# Patient Record
Sex: MALE | Race: WHITE | NOT HISPANIC OR LATINO | Employment: OTHER | URBAN - METROPOLITAN AREA
[De-identification: names, ages, dates, MRNs, and addresses within clinical notes are randomized per-mention and may not be internally consistent; named-entity substitution may affect disease eponyms.]

---

## 2023-03-17 LAB
ALBUMIN (G/DL) IN SER/PLAS: 3.8 G/DL (ref 3.4–5)
ALBUMIN (MG/L) IN URINE: 10.8 MG/L
ALBUMIN/CREATININE (UG/MG) IN URINE: 12.5 UG/MG CRT (ref 0–30)
ANION GAP IN SER/PLAS: 13 MMOL/L (ref 10–20)
CALCIDIOL (25 OH VITAMIN D3) (NG/ML) IN SER/PLAS: 50 NG/ML
CALCIUM (MG/DL) IN SER/PLAS: 9.1 MG/DL (ref 8.6–10.3)
CARBON DIOXIDE, TOTAL (MMOL/L) IN SER/PLAS: 28 MMOL/L (ref 21–32)
CHLORIDE (MMOL/L) IN SER/PLAS: 103 MMOL/L (ref 98–107)
CREATININE (MG/DL) IN SER/PLAS: 2.47 MG/DL (ref 0.5–1.3)
CREATININE (MG/DL) IN URINE: 86.3 MG/DL (ref 20–370)
ERYTHROCYTE DISTRIBUTION WIDTH (RATIO) BY AUTOMATED COUNT: 15 % (ref 11.5–14.5)
ERYTHROCYTE MEAN CORPUSCULAR HEMOGLOBIN CONCENTRATION (G/DL) BY AUTOMATED: 31.1 G/DL (ref 32–36)
ERYTHROCYTE MEAN CORPUSCULAR VOLUME (FL) BY AUTOMATED COUNT: 83 FL (ref 80–100)
ERYTHROCYTES (10*6/UL) IN BLOOD BY AUTOMATED COUNT: 4.46 X10E12/L (ref 4.5–5.9)
GFR MALE: 25 ML/MIN/1.73M2
GLUCOSE (MG/DL) IN SER/PLAS: 211 MG/DL (ref 74–99)
HEMATOCRIT (%) IN BLOOD BY AUTOMATED COUNT: 37 % (ref 41–52)
HEMOGLOBIN (G/DL) IN BLOOD: 11.5 G/DL (ref 13.5–17.5)
LEUKOCYTES (10*3/UL) IN BLOOD BY AUTOMATED COUNT: 7.7 X10E9/L (ref 4.4–11.3)
PHOSPHATE (MG/DL) IN SER/PLAS: 3.5 MG/DL (ref 2.5–4.9)
PLATELETS (10*3/UL) IN BLOOD AUTOMATED COUNT: 174 X10E9/L (ref 150–450)
POTASSIUM (MMOL/L) IN SER/PLAS: 3.5 MMOL/L (ref 3.5–5.3)
SODIUM (MMOL/L) IN SER/PLAS: 140 MMOL/L (ref 136–145)
THYROTROPIN (MIU/L) IN SER/PLAS BY DETECTION LIMIT <= 0.05 MIU/L: 2.42 MIU/L (ref 0.44–3.98)
UREA NITROGEN (MG/DL) IN SER/PLAS: 42 MG/DL (ref 6–23)

## 2023-03-18 LAB
ESTIMATED AVERAGE GLUCOSE FOR HBA1C: 151 MG/DL
HEMOGLOBIN A1C/HEMOGLOBIN TOTAL IN BLOOD: 6.9 %
PARATHYRIN INTACT (PG/ML) IN SER/PLAS: 114.7 PG/ML (ref 18.5–88)

## 2023-04-12 LAB
ACTIVATED PARTIAL THROMBOPLASTIN TIME IN PPP BY COAGULATION ASSAY: 29 SEC (ref 26–39)
ALANINE AMINOTRANSFERASE (SGPT) (U/L) IN SER/PLAS: 11 U/L (ref 10–52)
ALBUMIN (G/DL) IN SER/PLAS: 3.8 G/DL (ref 3.4–5)
ALKALINE PHOSPHATASE (U/L) IN SER/PLAS: 59 U/L (ref 33–136)
ANION GAP IN SER/PLAS: 13 MMOL/L (ref 10–20)
ASPARTATE AMINOTRANSFERASE (SGOT) (U/L) IN SER/PLAS: 14 U/L (ref 9–39)
BASOPHILS (10*3/UL) IN BLOOD BY AUTOMATED COUNT: 0.09 X10E9/L (ref 0–0.1)
BASOPHILS/100 LEUKOCYTES IN BLOOD BY AUTOMATED COUNT: 1.1 % (ref 0–2)
BILIRUBIN TOTAL (MG/DL) IN SER/PLAS: 0.8 MG/DL (ref 0–1.2)
CALCIUM (MG/DL) IN SER/PLAS: 9.4 MG/DL (ref 8.6–10.3)
CARBON DIOXIDE, TOTAL (MMOL/L) IN SER/PLAS: 26 MMOL/L (ref 21–32)
CHLORIDE (MMOL/L) IN SER/PLAS: 106 MMOL/L (ref 98–107)
CREATININE (MG/DL) IN SER/PLAS: 2.74 MG/DL (ref 0.5–1.3)
EOSINOPHILS (10*3/UL) IN BLOOD BY AUTOMATED COUNT: 0.44 X10E9/L (ref 0–0.4)
EOSINOPHILS/100 LEUKOCYTES IN BLOOD BY AUTOMATED COUNT: 5.5 % (ref 0–6)
ERYTHROCYTE DISTRIBUTION WIDTH (RATIO) BY AUTOMATED COUNT: 15.1 % (ref 11.5–14.5)
ERYTHROCYTE MEAN CORPUSCULAR HEMOGLOBIN CONCENTRATION (G/DL) BY AUTOMATED: 31.2 G/DL (ref 32–36)
ERYTHROCYTE MEAN CORPUSCULAR VOLUME (FL) BY AUTOMATED COUNT: 82 FL (ref 80–100)
ERYTHROCYTES (10*6/UL) IN BLOOD BY AUTOMATED COUNT: 4.52 X10E12/L (ref 4.5–5.9)
GFR MALE: 22 ML/MIN/1.73M2
GLUCOSE (MG/DL) IN SER/PLAS: 184 MG/DL (ref 74–99)
HEMATOCRIT (%) IN BLOOD BY AUTOMATED COUNT: 36.9 % (ref 41–52)
HEMOGLOBIN (G/DL) IN BLOOD: 11.5 G/DL (ref 13.5–17.5)
IMMATURE GRANULOCYTES/100 LEUKOCYTES IN BLOOD BY AUTOMATED COUNT: 0.2 % (ref 0–0.9)
INR IN PPP BY COAGULATION ASSAY: 1.1 (ref 0.9–1.1)
LEUKOCYTES (10*3/UL) IN BLOOD BY AUTOMATED COUNT: 8.1 X10E9/L (ref 4.4–11.3)
LYMPHOCYTES (10*3/UL) IN BLOOD BY AUTOMATED COUNT: 2.01 X10E9/L (ref 0.8–3)
LYMPHOCYTES/100 LEUKOCYTES IN BLOOD BY AUTOMATED COUNT: 24.9 % (ref 13–44)
MONOCYTES (10*3/UL) IN BLOOD BY AUTOMATED COUNT: 0.58 X10E9/L (ref 0.05–0.8)
MONOCYTES/100 LEUKOCYTES IN BLOOD BY AUTOMATED COUNT: 7.2 % (ref 2–10)
NEUTROPHILS (10*3/UL) IN BLOOD BY AUTOMATED COUNT: 4.93 X10E9/L (ref 1.6–5.5)
NEUTROPHILS/100 LEUKOCYTES IN BLOOD BY AUTOMATED COUNT: 61.1 % (ref 40–80)
PLATELETS (10*3/UL) IN BLOOD AUTOMATED COUNT: 183 X10E9/L (ref 150–450)
POTASSIUM (MMOL/L) IN SER/PLAS: 3.6 MMOL/L (ref 3.5–5.3)
PROTEIN TOTAL: 6.6 G/DL (ref 6.4–8.2)
PROTHROMBIN TIME (PT) IN PPP BY COAGULATION ASSAY: 12.6 SEC (ref 9.8–13.4)
SODIUM (MMOL/L) IN SER/PLAS: 141 MMOL/L (ref 136–145)
UREA NITROGEN (MG/DL) IN SER/PLAS: 41 MG/DL (ref 6–23)

## 2023-04-17 ENCOUNTER — TELEPHONE (OUTPATIENT)
Dept: PRIMARY CARE | Facility: CLINIC | Age: 83
End: 2023-04-17
Payer: MEDICARE

## 2023-04-17 DIAGNOSIS — N40.0 BENIGN PROSTATIC HYPERPLASIA, UNSPECIFIED WHETHER LOWER URINARY TRACT SYMPTOMS PRESENT: ICD-10-CM

## 2023-04-17 RX ORDER — FINASTERIDE 5 MG/1
5 TABLET, FILM COATED ORAL DAILY
Qty: 90 TABLET | Refills: 1 | Status: SHIPPED | OUTPATIENT
Start: 2023-04-17 | End: 2024-03-05 | Stop reason: SDUPTHER

## 2023-04-17 RX ORDER — FINASTERIDE 5 MG/1
1 TABLET, FILM COATED ORAL DAILY
COMMUNITY
Start: 2020-04-15 | End: 2023-04-17 | Stop reason: SDUPTHER

## 2023-05-11 PROBLEM — D63.1 ANEMIA IN STAGE 3B CHRONIC KIDNEY DISEASE (MULTI): Status: ACTIVE | Noted: 2023-05-11

## 2023-05-11 PROBLEM — M10.9 GOUT: Status: ACTIVE | Noted: 2022-11-03

## 2023-05-11 PROBLEM — N18.32 ANEMIA IN STAGE 3B CHRONIC KIDNEY DISEASE (MULTI): Status: ACTIVE | Noted: 2023-05-11

## 2023-05-11 PROBLEM — I10 ESSENTIAL HYPERTENSION: Status: ACTIVE | Noted: 2022-10-26

## 2023-05-11 PROBLEM — I69.352: Status: ACTIVE | Noted: 2022-08-10

## 2023-05-11 PROBLEM — N18.9 CHRONIC RENAL INSUFFICIENCY: Status: ACTIVE | Noted: 2022-11-10

## 2023-05-11 PROBLEM — K21.9 GERD (GASTROESOPHAGEAL REFLUX DISEASE): Status: ACTIVE | Noted: 2022-11-03

## 2023-05-11 PROBLEM — E78.2 MIXED HYPERLIPIDEMIA: Status: ACTIVE | Noted: 2022-11-03

## 2023-05-11 PROBLEM — N40.0 BPH (BENIGN PROSTATIC HYPERPLASIA): Status: ACTIVE | Noted: 2023-05-11

## 2023-05-11 PROBLEM — E11.9 DIABETES MELLITUS (MULTI): Status: ACTIVE | Noted: 2022-07-12

## 2023-05-11 PROBLEM — I63.9 ISCHEMIC STROKE (MULTI): Status: ACTIVE | Noted: 2023-05-11

## 2023-05-12 ENCOUNTER — OFFICE VISIT (OUTPATIENT)
Dept: PRIMARY CARE | Facility: CLINIC | Age: 83
End: 2023-05-12
Payer: MEDICARE

## 2023-05-12 VITALS
BODY MASS INDEX: 37.28 KG/M2 | SYSTOLIC BLOOD PRESSURE: 108 MMHG | RESPIRATION RATE: 14 BRPM | HEIGHT: 66 IN | DIASTOLIC BLOOD PRESSURE: 58 MMHG | HEART RATE: 57 BPM | OXYGEN SATURATION: 98 % | TEMPERATURE: 97.4 F | WEIGHT: 232 LBS

## 2023-05-12 DIAGNOSIS — E66.01 OBESITY, MORBID (MULTI): ICD-10-CM

## 2023-05-12 DIAGNOSIS — J30.2 SEASONAL ALLERGIES: ICD-10-CM

## 2023-05-12 DIAGNOSIS — Z12.5 PROSTATE CANCER SCREENING: ICD-10-CM

## 2023-05-12 DIAGNOSIS — Z00.00 ENCOUNTER FOR MEDICARE ANNUAL WELLNESS EXAM: ICD-10-CM

## 2023-05-12 DIAGNOSIS — I69.352 HEMIPARESIS OF LEFT DOMINANT SIDE AS LATE EFFECT OF CEREBRAL INFARCTION (MULTI): ICD-10-CM

## 2023-05-12 DIAGNOSIS — N18.32 ANEMIA IN STAGE 3B CHRONIC KIDNEY DISEASE (MULTI): ICD-10-CM

## 2023-05-12 DIAGNOSIS — E78.2 MIXED HYPERLIPIDEMIA: ICD-10-CM

## 2023-05-12 DIAGNOSIS — Z00.00 ROUTINE GENERAL MEDICAL EXAMINATION AT HEALTH CARE FACILITY: Primary | ICD-10-CM

## 2023-05-12 DIAGNOSIS — E11.69 TYPE 2 DIABETES MELLITUS WITH OTHER SPECIFIED COMPLICATION, UNSPECIFIED WHETHER LONG TERM INSULIN USE (MULTI): ICD-10-CM

## 2023-05-12 DIAGNOSIS — D63.1 ANEMIA IN STAGE 3B CHRONIC KIDNEY DISEASE (MULTI): ICD-10-CM

## 2023-05-12 DIAGNOSIS — I10 ESSENTIAL HYPERTENSION: ICD-10-CM

## 2023-05-12 PROCEDURE — 1159F MED LIST DOCD IN RCRD: CPT | Performed by: FAMILY MEDICINE

## 2023-05-12 PROCEDURE — 1157F ADVNC CARE PLAN IN RCRD: CPT | Performed by: FAMILY MEDICINE

## 2023-05-12 PROCEDURE — 3078F DIAST BP <80 MM HG: CPT | Performed by: FAMILY MEDICINE

## 2023-05-12 PROCEDURE — 99213 OFFICE O/P EST LOW 20 MIN: CPT | Performed by: FAMILY MEDICINE

## 2023-05-12 PROCEDURE — 1160F RVW MEDS BY RX/DR IN RCRD: CPT | Performed by: FAMILY MEDICINE

## 2023-05-12 PROCEDURE — G0439 PPPS, SUBSEQ VISIT: HCPCS | Performed by: FAMILY MEDICINE

## 2023-05-12 PROCEDURE — 1170F FXNL STATUS ASSESSED: CPT | Performed by: FAMILY MEDICINE

## 2023-05-12 PROCEDURE — 3074F SYST BP LT 130 MM HG: CPT | Performed by: FAMILY MEDICINE

## 2023-05-12 RX ORDER — SITAGLIPTIN 50 MG/1
50 TABLET, FILM COATED ORAL DAILY
COMMUNITY
Start: 2020-04-15 | End: 2023-08-25

## 2023-05-12 RX ORDER — CLOPIDOGREL BISULFATE 75 MG/1
75 TABLET ORAL EVERY OTHER DAY
COMMUNITY
End: 2023-11-21 | Stop reason: ALTCHOICE

## 2023-05-12 RX ORDER — NAPROXEN SODIUM 220 MG/1
1 TABLET, FILM COATED ORAL DAILY
COMMUNITY
Start: 2020-04-15 | End: 2023-11-21 | Stop reason: ALTCHOICE

## 2023-05-12 RX ORDER — MAGNESIUM HYDROXIDE 400 MG/5ML
SUSPENSION, ORAL (FINAL DOSE FORM) ORAL
COMMUNITY

## 2023-05-12 RX ORDER — DIPHENHYDRAMINE HCL 25 MG
TABLET ORAL
COMMUNITY
End: 2023-07-21 | Stop reason: ALTCHOICE

## 2023-05-12 RX ORDER — ATORVASTATIN CALCIUM 40 MG/1
40 TABLET, FILM COATED ORAL DAILY
COMMUNITY
Start: 2020-04-15 | End: 2024-03-05 | Stop reason: SDUPTHER

## 2023-05-12 RX ORDER — TERAZOSIN 1 MG/1
1 CAPSULE ORAL EVERY 12 HOURS
COMMUNITY
Start: 2020-04-15 | End: 2023-07-17 | Stop reason: SDUPTHER

## 2023-05-12 RX ORDER — MELATONIN 10 MG/ML
DROPS ORAL
COMMUNITY
Start: 2022-08-11

## 2023-05-12 RX ORDER — AMLODIPINE BESYLATE 5 MG/1
TABLET ORAL EVERY 24 HOURS
COMMUNITY
Start: 2020-04-27 | End: 2023-09-11 | Stop reason: SDUPTHER

## 2023-05-12 RX ORDER — ALBUTEROL SULFATE 90 UG/1
2 AEROSOL, METERED RESPIRATORY (INHALATION) EVERY 6 HOURS PRN
Qty: 18 G | Refills: 3 | Status: SHIPPED | OUTPATIENT
Start: 2023-05-12 | End: 2024-05-11

## 2023-05-12 RX ORDER — PANTOPRAZOLE SODIUM 40 MG/1
40 TABLET, DELAYED RELEASE ORAL
COMMUNITY
Start: 2020-04-15 | End: 2023-11-17

## 2023-05-12 RX ORDER — VALSARTAN AND HYDROCHLOROTHIAZIDE 320; 25 MG/1; MG/1
1 TABLET, FILM COATED ORAL DAILY
COMMUNITY
Start: 2020-04-15 | End: 2023-11-17

## 2023-05-12 RX ORDER — PNV NO.95/FERROUS FUM/FOLIC AC 28MG-0.8MG
TABLET ORAL
COMMUNITY
Start: 2022-06-30

## 2023-05-12 ASSESSMENT — ACTIVITIES OF DAILY LIVING (ADL)
MANAGING_FINANCES: INDEPENDENT
GROCERY_SHOPPING: INDEPENDENT
TAKING_MEDICATION: INDEPENDENT
DOING_HOUSEWORK: INDEPENDENT
BATHING: INDEPENDENT
DRESSING: INDEPENDENT

## 2023-05-12 ASSESSMENT — PATIENT HEALTH QUESTIONNAIRE - PHQ9
2. FEELING DOWN, DEPRESSED OR HOPELESS: NOT AT ALL
SUM OF ALL RESPONSES TO PHQ9 QUESTIONS 1 AND 2: 0
1. LITTLE INTEREST OR PLEASURE IN DOING THINGS: NOT AT ALL

## 2023-05-12 ASSESSMENT — ENCOUNTER SYMPTOMS
LOSS OF SENSATION IN FEET: 0
DEPRESSION: 0
OCCASIONAL FEELINGS OF UNSTEADINESS: 1

## 2023-05-12 NOTE — PROGRESS NOTES
"Subjective   Reason for Visit: Juarez Chávez is an 83 y.o. male here for a Medicare Wellness visit.          Reviewed all medications by prescribing practitioner or clinical pharmacist (such as prescriptions, OTCs, herbal therapies and supplements) and documented in the medical record.    HPI    Patient Care Team:  Jaimee Monte MD as PCP - General     Review of Systems    Objective   Vitals:  /58 (BP Location: Left arm, Patient Position: Sitting, BP Cuff Size: Adult)   Pulse 57   Temp 36.3 °C (97.4 °F) (Temporal)   Resp 14   Ht 1.676 m (5' 6\")   Wt 105 kg (232 lb)   SpO2 98%   BMI 37.45 kg/m²       Physical Exam    Assessment/Plan   Problem List Items Addressed This Visit        Nervous    Hemiparesis of left dominant side as late effect of cerebral infarction (CMS/[facility])    Overview     Comment on above: Hemiparesis of left dominant side as late effect of cerebral infarction;            Circulatory    Essential hypertension       Genitourinary    Anemia in stage 3b chronic kidney disease       Endocrine/Metabolic    Diabetes mellitus (CMS/[facility])    Overview     Comment on above: Controlled diabetes mellitus with diabetic nephropathy;  Controlled type 2 diabetes mellitus with diabetic polyneuropathy;            Other    Mixed hyperlipidemia   Other Visit Diagnoses     Routine general medical examination at health care facility    -  Primary    Encounter for Medicare annual wellness exam        Seasonal allergies                 "

## 2023-05-12 NOTE — PROGRESS NOTES
Subjective   Reason for Visit: Juarez Chávez is a 83 y.o. male here for a Medicare Wellness visit.   Hba1c 6.9    CHECKLIST REVIEWED AND COMPLETE FOR AMW    Past Medical, Surgical, and Family History reviewed and updated in chart.    Reviewed all medications by prescribing practitioner or clinical pharmacist (such as prescriptions, OTCs, herbal therapies and supplements) and documented in the medical record.  Medicare Annual Wellness Visit Subsequent, Diabetes, and Hypertension  HPI    Patient Self Assessment of Health Status  Patient Self Assessment: Good    Nutrition and Exercise  Current Diet: Diabetic Diet  Adequate Fluid Intake: Yes  Caffeine: Yes  Exercise Frequency: Regularly    Functional Ability/Level of Safety  Cognitive Impairment Observed: No cognitive impairment observed    Home Safety Risk Factors: None    Patient Care Team:  Jaimee Monte MD as PCP - General    HPI  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    BPH (benign prostatic hyperplasia)    Ischemic stroke (CMS/HCC)    Chronic renal insufficiency    Diabetes mellitus (CMS/HCC)    Essential hypertension    GERD (gastroesophageal reflux disease)    Gout    Hemiparesis of left dominant side as late effect of cerebral infarction (CMS/HCC)    Mixed hyperlipidemia    Obesity, morbid (CMS/HCC)      Past Surgical History:   Procedure Laterality Date    COLECTOMY  2006    COLONOSCOPY  2006    after colectomy    GASTROSTOMY TUBE PLACEMENT      2/2019-8/2019    MR HEAD ANGIO WO IV CONTRAST  08/09/2022    MR HEAD ANGIO WO IV CONTRAST 8/9/2022 [facility] CLINICAL LEGACY    MR NECK ANGIO WO IV CONTRAST  08/09/2022    MR NECK ANGIO WO IV CONTRAST 8/9/2022 [facility] CLINICAL LEGACY    VASCULAR SURGERY  2023    Stent placement B/L Lower Extremetry - Feb 2023       Review of Systems hx cva    This patient has   NO history of recent Covid nor flu symptoms,  NO Fever nor chills,  NO Chest pain, shortness of breath nor  "paroxysmal nocturnal dyspnea,  NO Nausea, vomiting, nor diarrhea,  NO Hematochezia nor melena,  NO Dysuria, hematuria, nor new incontinence issues  NO new severe headaches nor neurological complaints,  NO new issues with anxiety nor depression nor new psychiatric complaints,  NO suicidal nor homicidal ideations.     OBJECTIVE:  /58 (BP Location: Left arm, Patient Position: Sitting, BP Cuff Size: Adult)   Pulse 57   Temp 36.3 °C (97.4 °F) (Temporal)   Resp 14   Ht 1.676 m (5' 6\")   Wt 105 kg (232 lb)   SpO2 98%   BMI 37.45 kg/m²      General:  alert, oriented, no acute distress.  No obvious skin rashes noted.   No gait disturbance noted.    Mood is pleasant, not tearful, no signs of emotional distress.  Not appearing intoxicated or altered.   No voiced delusions,   Normal, appropriate behavior.    HEENT: Normocephalic, atraumatic,   Pupils round, reactive to light  Extraocular motions intact and wnl  Tympanic membranes normal    Neck: no nuchal rigidity  No masses palpable.  No carotid bruits.  No thyromegaly.    Respiratory: Equal breath sounds  No wheezes,    rales,    nor rhonchi  No respiratory distress.    Heart: Regular rate and rhythm, no    murmurs  no rubs/gallops    Abdomen: no masses palpable, no rebound nor guarding, no rebound nor guarding overwt.    Extremities: NO cyanosis noted, no clubbing.   Trace LE edema noted.  2+dorsalis pedis pulses.    Normal-not antalgic, steady gait.  WHEELCHAIR  Weak LEs    Orders Only on 04/12/2023   Component Date Value Ref Range Status    WBC 04/12/2023 8.1  4.4 - 11.3 x10E9/L Final    RBC 04/12/2023 4.52  4.50 - 5.90 x10E12/L Final    Hemoglobin 04/12/2023 11.5 (L)  13.5 - 17.5 g/dL Final    Hematocrit 04/12/2023 36.9 (L)  41.0 - 52.0 % Final    MCV 04/12/2023 82  80 - 100 fL Final    MCHC 04/12/2023 31.2 (L)  32.0 - 36.0 g/dL Final    Platelets 04/12/2023 183  150 - 450 x10E9/L Final    RDW 04/12/2023 15.1 (H)  11.5 - 14.5 % Final    Neutrophils % " 04/12/2023 61.1  40.0 - 80.0 % Final    Immature Granulocytes %, Automated 04/12/2023 0.2  0.0 - 0.9 % Final    Comment:  Immature Granulocyte Count (IG) includes promyelocytes,    myelocytes and metamyelocytes but does not include bands.   Percent differential counts (%) should be interpreted in the   context of the absolute cell counts (cells/L).      Lymphocytes % 04/12/2023 24.9  13.0 - 44.0 % Final    Monocytes % 04/12/2023 7.2  2.0 - 10.0 % Final    Eosinophils % 04/12/2023 5.5  0.0 - 6.0 % Final    Basophils % 04/12/2023 1.1  0.0 - 2.0 % Final    Neutrophils Absolute 04/12/2023 4.93  1.60 - 5.50 x10E9/L Final    Lymphocytes Absolute 04/12/2023 2.01  0.80 - 3.00 x10E9/L Final    Monocytes Absolute 04/12/2023 0.58  0.05 - 0.80 x10E9/L Final    Eosinophils Absolute 04/12/2023 0.44 (H)  0.00 - 0.40 x10E9/L Final    Basophils Absolute 04/12/2023 0.09  0.00 - 0.10 x10E9/L Final   Orders Only on 04/12/2023   Component Date Value Ref Range Status    aPTT 04/12/2023 29  26 - 39 sec Final    Comment:   THE APTT IS NO LONGER USED FOR MONITORING     UNFRACTIONATED HEPARIN THERAPY.    FOR MONITORING HEPARIN THERAPY,     USE THE HEPARIN ASSAY.     Orders Only on 04/12/2023   Component Date Value Ref Range Status    Glucose 04/12/2023 184 (H)  74 - 99 mg/dL Final    Sodium 04/12/2023 141  136 - 145 mmol/L Final    Potassium 04/12/2023 3.6  3.5 - 5.3 mmol/L Final    Chloride 04/12/2023 106  98 - 107 mmol/L Final    Bicarbonate 04/12/2023 26  21 - 32 mmol/L Final    Anion Gap 04/12/2023 13  10 - 20 mmol/L Final    Urea Nitrogen 04/12/2023 41 (H)  6 - 23 mg/dL Final    Creatinine 04/12/2023 2.74 (H)  0.50 - 1.30 mg/dL Final    GFR MALE 04/12/2023 22 (A)  >90 mL/min/1.73m2 Final    Comment:  CALCULATIONS OF ESTIMATED GFR ARE PERFORMED   USING THE 2021 CKD-EPI STUDY REFIT EQUATION   WITHOUT THE RACE VARIABLE FOR THE IDMS-TRACEABLE   CREATININE METHODS.    https://jasn.asnjournals.org/content/early/2021/09/22/ASN.2141111989       Calcium 04/12/2023 9.4  8.6 - 10.3 mg/dL Final    Albumin 04/12/2023 3.8  3.4 - 5.0 g/dL Final    Alkaline Phosphatase 04/12/2023 59  33 - 136 U/L Final    Total Protein 04/12/2023 6.6  6.4 - 8.2 g/dL Final    AST 04/12/2023 14  9 - 39 U/L Final    Total Bilirubin 04/12/2023 0.8  0.0 - 1.2 mg/dL Final    ALT (SGPT) 04/12/2023 11  10 - 52 U/L Final    Comment:  Patients treated with Sulfasalazine may generate    falsely decreased results for ALT.     Orders Only on 04/12/2023   Component Date Value Ref Range Status    Protime 04/12/2023 12.6  9.8 - 13.4 sec Final    INR 04/12/2023 1.1  0.9 - 1.1 Final   Orders Only on 03/17/2023   Component Date Value Ref Range Status    Vitamin D, 25-Hydroxy 03/17/2023 50  ng/mL Final    Comment: .  DEFICIENCY:         < 20   NG/ML  INSUFFICIENCY:      20-29  NG/ML  SUFFICIENCY:         NG/ML    THIS ASSAY ACCURATELY QUANTIFIES THE SUM OF  VITAMIN D3, 25-HYDROXY AND VIT D2,25-HYDROXY.     Orders Only on 03/17/2023   Component Date Value Ref Range Status    TSH 03/17/2023 2.42  0.44 - 3.98 mIU/L Final    Comment:  TSH testing is performed using different testing    methodology at [facility] than at other    [facility]. Direct result comparisons should    only be made within the same method.     Orders Only on 03/17/2023   Component Date Value Ref Range Status    ALBUMIN (MG/L) IN URINE 03/17/2023 10.8  Not Established mg/L Final    Albumin/Creatine Ratio 03/17/2023 12.5  0.0 - 30.0 ug/mg crt Final    Creatinine, Urine 03/17/2023 86.3  20.0 - 370.0 mg/dL Final   Orders Only on 03/17/2023   Component Date Value Ref Range Status    PTH 03/17/2023 114.7 (H)  18.5 - 88.0 pg/mL Final   Orders Only on 03/17/2023   Component Date Value Ref Range Status    Hemoglobin A1C 03/17/2023 6.9 (A)  % Final    Comment:      Diagnosis of Diabetes-Adults   Non-Diabetic: < or = 5.6%   Increased risk for developing diabetes: 5.7-6.4%   Diagnostic of diabetes: > or = 6.5%  .        Monitoring of Diabetes                Age (y)     Therapeutic Goal (%)   Adults:          >18           <7.0   Pediatrics:    13-18           <7.5                   7-12           <8.0                   0- 6            7.5-8.5   American Diabetes Association. Diabetes Care 33(S1), Jan 2010.      Estimated Average Glucose 03/17/2023 151  MG/DL Final   Orders Only on 03/17/2023   Component Date Value Ref Range Status    WBC 03/17/2023 7.7  4.4 - 11.3 x10E9/L Final    RBC 03/17/2023 4.46 (L)  4.50 - 5.90 x10E12/L Final    Hemoglobin 03/17/2023 11.5 (L)  13.5 - 17.5 g/dL Final    Hematocrit 03/17/2023 37.0 (L)  41.0 - 52.0 % Final    MCV 03/17/2023 83  80 - 100 fL Final    MCHC 03/17/2023 31.1 (L)  32.0 - 36.0 g/dL Final    Platelets 03/17/2023 174  150 - 450 x10E9/L Final    RDW 03/17/2023 15.0 (H)  11.5 - 14.5 % Final   There may be more visits with results that are not included.        Assessment/Plan     Problem List Items Addressed This Visit       Anemia in stage 3b chronic kidney disease    Diabetes mellitus (CMS/HCC)    Relevant Orders    Comprehensive Metabolic Panel    Hemoglobin A1C    Lipid Panel    Essential hypertension    Hemiparesis of left dominant side as late effect of cerebral infarction (CMS/HCC)    Mixed hyperlipidemia    Obesity, morbid (CMS/[facility])     Other Visit Diagnoses       Routine general medical examination at health care facility    -  Primary    Encounter for Medicare annual wellness exam        Seasonal allergies        Relevant Medications    albuterol 90 mcg/actuation inhaler    Prostate cancer screening        Relevant Orders    Prostate Spec.Ag,Screen          Advance Care Planning Note   Discussion Date: 06/13/23   Discussion Participants: patient  Full NO CODE DESIRED  The patient wishes to discuss Advance Care Planning today and the following is a brief summary of our discussion.     Patient has capacity to make their own medical decisions: Yes  Health Care Agent/Surrogate Decision  Maker documented in chart: Yes  Documents on file and valid:  Advance Directive/Living Will: Yes   Health Care Power of : Yes  Communication of Medical Status/Prognosis:   yes   Communication of Treatment Goals/Options:   yes  Treatment Decisions  yes  Time Statement: Total face to face time spent on advance care planning was <30 minutes with <30 minutes spent in counseling, including the explanation.    SEE ME AT NEXT REGULARLY SCHEDULED VISIT-sooner if condition deteriorates or new problems arise.    And again had a lengthy discussion w pt  about risks of poorly controlled diabetes including micro and macrovascular complications of DM2 including blindness,MI,CVA and death among other possibilities. Pt aware and agrees to better compliance and adherance to instructions such as regular eye exams q 1-2 y, foot exams,and f/u regularly for hba1c with a goal of 6.5.    NO evidence of neuropathy or nephropathy at this point    Follow up as planned for hba1c and BP checks REGULARLY.  No asa and every other day plavix bc epistaxis  Offered psa and rba addressed  Meds all reviewed and explained  Increase fluids

## 2023-06-13 PROBLEM — E66.01 OBESITY, MORBID (MULTI): Status: ACTIVE | Noted: 2023-06-13

## 2023-07-17 DIAGNOSIS — I10 ESSENTIAL HYPERTENSION: Primary | ICD-10-CM

## 2023-07-17 RX ORDER — TERAZOSIN 1 MG/1
1 CAPSULE ORAL 2 TIMES DAILY
COMMUNITY
End: 2023-07-17 | Stop reason: SDUPTHER

## 2023-07-17 RX ORDER — TERAZOSIN 1 MG/1
1 CAPSULE ORAL EVERY 12 HOURS
Qty: 90 CAPSULE | Refills: 0 | Status: SHIPPED | OUTPATIENT
Start: 2023-07-17 | End: 2023-11-21 | Stop reason: SDUPTHER

## 2023-07-17 RX ORDER — TERAZOSIN 1 MG/1
1 CAPSULE ORAL EVERY 12 HOURS
Qty: 90 CAPSULE | Refills: 1 | Status: SHIPPED | OUTPATIENT
Start: 2023-07-17 | End: 2023-10-24

## 2023-07-17 NOTE — TELEPHONE ENCOUNTER
Mell pt needs refill  Terazosin 1mg, 1 capsule twice daily  Pt needs short term to [facility] & 90 day to [facility] as pt is out of meds  Pt's # [phone]

## 2023-07-21 ENCOUNTER — LAB (OUTPATIENT)
Dept: LAB | Facility: LAB | Age: 83
End: 2023-07-21
Payer: MEDICARE

## 2023-07-21 ENCOUNTER — OFFICE VISIT (OUTPATIENT)
Dept: PRIMARY CARE | Facility: CLINIC | Age: 83
End: 2023-07-21
Payer: MEDICARE

## 2023-07-21 VITALS
BODY MASS INDEX: 38.25 KG/M2 | DIASTOLIC BLOOD PRESSURE: 58 MMHG | SYSTOLIC BLOOD PRESSURE: 108 MMHG | RESPIRATION RATE: 16 BRPM | OXYGEN SATURATION: 96 % | WEIGHT: 238 LBS | TEMPERATURE: 97.5 F | HEART RATE: 74 BPM | HEIGHT: 66 IN

## 2023-07-21 DIAGNOSIS — N18.4 CHRONIC KIDNEY DISEASE, STAGE 4 (SEVERE) (MULTI): ICD-10-CM

## 2023-07-21 DIAGNOSIS — Z12.5 PROSTATE CANCER SCREENING: ICD-10-CM

## 2023-07-21 DIAGNOSIS — D63.1 ANEMIA IN STAGE 3B CHRONIC KIDNEY DISEASE (MULTI): ICD-10-CM

## 2023-07-21 DIAGNOSIS — E11.69 TYPE 2 DIABETES MELLITUS WITH OTHER SPECIFIED COMPLICATION, UNSPECIFIED WHETHER LONG TERM INSULIN USE (MULTI): ICD-10-CM

## 2023-07-21 DIAGNOSIS — E78.2 MIXED HYPERLIPIDEMIA: ICD-10-CM

## 2023-07-21 DIAGNOSIS — I77.1 STRICTURE OF ARTERY (CMS-HCC): ICD-10-CM

## 2023-07-21 DIAGNOSIS — N18.9 CHRONIC RENAL IMPAIRMENT, UNSPECIFIED CKD STAGE: ICD-10-CM

## 2023-07-21 DIAGNOSIS — E11.69 TYPE 2 DIABETES MELLITUS WITH OTHER SPECIFIED COMPLICATION, WITHOUT LONG-TERM CURRENT USE OF INSULIN (MULTI): ICD-10-CM

## 2023-07-21 DIAGNOSIS — I10 ESSENTIAL HYPERTENSION: ICD-10-CM

## 2023-07-21 DIAGNOSIS — L97.511 NON-PRESSURE CHRONIC ULCER OF OTHER PART OF RIGHT FOOT LIMITED TO BREAKDOWN OF SKIN (MULTI): ICD-10-CM

## 2023-07-21 DIAGNOSIS — L97.511 NON-PRESSURE CHRONIC ULCER OF OTHER PART OF RIGHT FOOT LIMITED TO BREAKDOWN OF SKIN (MULTI): Primary | ICD-10-CM

## 2023-07-21 DIAGNOSIS — N18.32 ANEMIA IN STAGE 3B CHRONIC KIDNEY DISEASE (MULTI): ICD-10-CM

## 2023-07-21 DIAGNOSIS — I69.352 HEMIPARESIS OF LEFT DOMINANT SIDE AS LATE EFFECT OF CEREBRAL INFARCTION (MULTI): ICD-10-CM

## 2023-07-21 DIAGNOSIS — M47.22 OSTEOARTHRITIS OF SPINE WITH RADICULOPATHY, CERVICAL REGION: ICD-10-CM

## 2023-07-21 DIAGNOSIS — I63.9 ISCHEMIC STROKE (MULTI): ICD-10-CM

## 2023-07-21 DIAGNOSIS — M25.511 ACUTE PAIN OF RIGHT SHOULDER: ICD-10-CM

## 2023-07-21 DIAGNOSIS — K21.9 GASTROESOPHAGEAL REFLUX DISEASE, UNSPECIFIED WHETHER ESOPHAGITIS PRESENT: ICD-10-CM

## 2023-07-21 DIAGNOSIS — S16.1XXA ACUTE STRAIN OF NECK MUSCLE, INITIAL ENCOUNTER: ICD-10-CM

## 2023-07-21 LAB
ALANINE AMINOTRANSFERASE (SGPT) (U/L) IN SER/PLAS: 9 U/L (ref 10–52)
ALBUMIN (G/DL) IN SER/PLAS: 3.8 G/DL (ref 3.4–5)
ALKALINE PHOSPHATASE (U/L) IN SER/PLAS: 59 U/L (ref 33–136)
ANION GAP IN SER/PLAS: 13 MMOL/L (ref 10–20)
ASPARTATE AMINOTRANSFERASE (SGOT) (U/L) IN SER/PLAS: 12 U/L (ref 9–39)
BASOPHILS (10*3/UL) IN BLOOD BY AUTOMATED COUNT: 0.08 X10E9/L (ref 0–0.1)
BASOPHILS/100 LEUKOCYTES IN BLOOD BY AUTOMATED COUNT: 1 % (ref 0–2)
BILIRUBIN TOTAL (MG/DL) IN SER/PLAS: 0.5 MG/DL (ref 0–1.2)
CALCIUM (MG/DL) IN SER/PLAS: 9.4 MG/DL (ref 8.6–10.3)
CARBON DIOXIDE, TOTAL (MMOL/L) IN SER/PLAS: 28 MMOL/L (ref 21–32)
CHLORIDE (MMOL/L) IN SER/PLAS: 108 MMOL/L (ref 98–107)
CHOLESTEROL (MG/DL) IN SER/PLAS: 94 MG/DL (ref 0–199)
CHOLESTEROL IN HDL (MG/DL) IN SER/PLAS: 32.7 MG/DL
CHOLESTEROL/HDL RATIO: 2.9
CREATININE (MG/DL) IN SER/PLAS: 2.39 MG/DL (ref 0.5–1.3)
EOSINOPHILS (10*3/UL) IN BLOOD BY AUTOMATED COUNT: 0.45 X10E9/L (ref 0–0.4)
EOSINOPHILS/100 LEUKOCYTES IN BLOOD BY AUTOMATED COUNT: 5.7 % (ref 0–6)
ERYTHROCYTE DISTRIBUTION WIDTH (RATIO) BY AUTOMATED COUNT: 15.7 % (ref 11.5–14.5)
ERYTHROCYTE MEAN CORPUSCULAR HEMOGLOBIN CONCENTRATION (G/DL) BY AUTOMATED: 31.4 G/DL (ref 32–36)
ERYTHROCYTE MEAN CORPUSCULAR VOLUME (FL) BY AUTOMATED COUNT: 78 FL (ref 80–100)
ERYTHROCYTES (10*6/UL) IN BLOOD BY AUTOMATED COUNT: 4.39 X10E12/L (ref 4.5–5.9)
ESTIMATED AVERAGE GLUCOSE FOR HBA1C: 154 MG/DL
GFR MALE: 26 ML/MIN/1.73M2
GLUCOSE (MG/DL) IN SER/PLAS: 185 MG/DL (ref 74–99)
HEMATOCRIT (%) IN BLOOD BY AUTOMATED COUNT: 34.4 % (ref 41–52)
HEMOGLOBIN (G/DL) IN BLOOD: 10.8 G/DL (ref 13.5–17.5)
HEMOGLOBIN A1C/HEMOGLOBIN TOTAL IN BLOOD: 7 %
IMMATURE GRANULOCYTES/100 LEUKOCYTES IN BLOOD BY AUTOMATED COUNT: 0.4 % (ref 0–0.9)
LDL: 31 MG/DL (ref 0–99)
LEUKOCYTES (10*3/UL) IN BLOOD BY AUTOMATED COUNT: 7.8 X10E9/L (ref 4.4–11.3)
LYMPHOCYTES (10*3/UL) IN BLOOD BY AUTOMATED COUNT: 1.7 X10E9/L (ref 0.8–3)
LYMPHOCYTES/100 LEUKOCYTES IN BLOOD BY AUTOMATED COUNT: 21.7 % (ref 13–44)
MONOCYTES (10*3/UL) IN BLOOD BY AUTOMATED COUNT: 0.65 X10E9/L (ref 0.05–0.8)
MONOCYTES/100 LEUKOCYTES IN BLOOD BY AUTOMATED COUNT: 8.3 % (ref 2–10)
NEUTROPHILS (10*3/UL) IN BLOOD BY AUTOMATED COUNT: 4.92 X10E9/L (ref 1.6–5.5)
NEUTROPHILS/100 LEUKOCYTES IN BLOOD BY AUTOMATED COUNT: 62.9 % (ref 40–80)
PLATELETS (10*3/UL) IN BLOOD AUTOMATED COUNT: 185 X10E9/L (ref 150–450)
POTASSIUM (MMOL/L) IN SER/PLAS: 3.8 MMOL/L (ref 3.5–5.3)
PROSTATE SPECIFIC ANTIGEN,SCREEN: 7.1 NG/ML (ref 0–4)
PROTEIN TOTAL: 6.8 G/DL (ref 6.4–8.2)
SODIUM (MMOL/L) IN SER/PLAS: 145 MMOL/L (ref 136–145)
TRIGLYCERIDE (MG/DL) IN SER/PLAS: 154 MG/DL (ref 0–149)
UREA NITROGEN (MG/DL) IN SER/PLAS: 39 MG/DL (ref 6–23)
VLDL: 31 MG/DL (ref 0–40)

## 2023-07-21 PROCEDURE — 80061 LIPID PANEL: CPT

## 2023-07-21 PROCEDURE — 80053 COMPREHEN METABOLIC PANEL: CPT

## 2023-07-21 PROCEDURE — 1160F RVW MEDS BY RX/DR IN RCRD: CPT | Performed by: FAMILY MEDICINE

## 2023-07-21 PROCEDURE — 36415 COLL VENOUS BLD VENIPUNCTURE: CPT

## 2023-07-21 PROCEDURE — 99214 OFFICE O/P EST MOD 30 MIN: CPT | Performed by: FAMILY MEDICINE

## 2023-07-21 PROCEDURE — G0103 PSA SCREENING: HCPCS

## 2023-07-21 PROCEDURE — 1157F ADVNC CARE PLAN IN RCRD: CPT | Performed by: FAMILY MEDICINE

## 2023-07-21 PROCEDURE — 3074F SYST BP LT 130 MM HG: CPT | Performed by: FAMILY MEDICINE

## 2023-07-21 PROCEDURE — 83036 HEMOGLOBIN GLYCOSYLATED A1C: CPT

## 2023-07-21 PROCEDURE — 3078F DIAST BP <80 MM HG: CPT | Performed by: FAMILY MEDICINE

## 2023-07-21 PROCEDURE — 1159F MED LIST DOCD IN RCRD: CPT | Performed by: FAMILY MEDICINE

## 2023-07-21 PROCEDURE — 85025 COMPLETE CBC W/AUTO DIFF WBC: CPT

## 2023-07-21 RX ORDER — LORATADINE 10 MG/1
10 TABLET ORAL DAILY
COMMUNITY
End: 2023-11-21 | Stop reason: ALTCHOICE

## 2023-07-21 NOTE — PROGRESS NOTES
Chief Complaint   Patient presents with    Diabetes    Hypertension    Hyperlipidemia    Neck Pain     Hard time turning head, tense    Shoulder Pain     Right--no known injury     Scope -declines    Juarez Chávez is here for a diabetic follow up    HBA1C= 6.9 and overdue      LDL=63 and due    Sugars most recently have been running-   HIGH & LOW <150   Activity level-     advised to increase  The patient denies history of       seizures,             heart attack or KNOWN CAD       or stroke.     No chest pain, shortness of breath, paroxysmal nocturnal dyspnea.     No nausea, vomiting, diarrhea, hematochezia or melena.      No paresthesias or headaches      No dysuria, frequency or hematuria.    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 CLINICAL LEGACY    MR NECK ANGIO WO IV CONTRAST  08/09/2022    MR NECK ANGIO WO IV CONTRAST 8/9/2022 [facility] CLINICAL LEGACY    VASCULAR SURGERY  2023    Stent placement B/L Lower Extremetry - Feb 2023     Social History     Socioeconomic History    Marital status:      Spouse name: None    Number of children: None    Years of education: None    Highest education level:  None   Occupational History    None   Tobacco Use    Smoking status: Former     Types: Cigarettes     Quit date:      Years since quittin.5    Smokeless tobacco: None   Substance and Sexual Activity    Alcohol use: Not Currently    Drug use: Never    Sexual activity: None   Other Topics Concern    None   Social History Narrative    None     Social Determinants of Health     Financial Resource Strain: Not on file   Food Insecurity: Not on file   Transportation Needs: Not on file   Physical Activity: Not on file   Stress: Not on file   Social Connections: Not on file   Intimate Partner Violence: Not on file   Housing Stability: Not on file     No visits with results within 3 Month(s) from this visit.   Latest known visit with results is:   Orders Only on 2023   Component Date Value Ref Range Status    WBC 2023 8.1  4.4 - 11.3 x10E9/L Final    RBC 2023 4.52  4.50 - 5.90 x10E12/L Final    Hemoglobin 2023 11.5 (L)  13.5 - 17.5 g/dL Final    Hematocrit 2023 36.9 (L)  41.0 - 52.0 % Final    MCV 2023 82  80 - 100 fL Final    MCHC 2023 31.2 (L)  32.0 - 36.0 g/dL Final    Platelets 2023 183  150 - 450 x10E9/L Final    RDW 2023 15.1 (H)  11.5 - 14.5 % Final    Neutrophils % 2023 61.1  40.0 - 80.0 % Final    Immature Granulocytes %, Automated 2023 0.2  0.0 - 0.9 % Final    Comment:  Immature Granulocyte Count (IG) includes promyelocytes,    myelocytes and metamyelocytes but does not include bands.   Percent differential counts (%) should be interpreted in the   context of the absolute cell counts (cells/L).      Lymphocytes % 2023 24.9  13.0 - 44.0 % Final    Monocytes % 2023 7.2  2.0 - 10.0 % Final    Eosinophils % 2023 5.5  0.0 - 6.0 % Final    Basophils % 2023 1.1  0.0 - 2.0 % Final    Neutrophils Absolute 2023 4.93  1.60 - 5.50 x10E9/L Final    Lymphocytes Absolute 2023 2.01  0.80 - 3.00 x10E9/L Final    Monocytes  Absolute 04/12/2023 0.58  0.05 - 0.80 x10E9/L Final    Eosinophils Absolute 04/12/2023 0.44 (H)  0.00 - 0.40 x10E9/L Final    Basophils Absolute 04/12/2023 0.09  0.00 - 0.10 x10E9/L Final        Health Maintenance   Topic Date Due    COVID-19 Vaccine (1) Never done    Pneumococcal Vaccine: 65+ Years (1 - PCV) Never done    Diabetes: Foot Exam  Never done    Diabetes: Retinopathy Screening  Never done    DTaP/Tdap/Td Vaccines (1 - Tdap) Never done    Zoster Vaccines (1 of 2) Never done    Diabetes: Hemoglobin A1C  06/17/2023    Lipid Panel  08/09/2023    Influenza Vaccine (1) 09/01/2023    Diabetes: Urine Protein Screening  03/17/2024    Medicare Annual Wellness Visit (AWV)  05/13/2024    HIB Vaccines  Aged Out    Hepatitis B Vaccines  Aged Out    IPV Vaccines  Aged Out    Hepatitis A Vaccines  Aged Out    Meningococcal Vaccine  Aged Out    Rotavirus Vaccines  Aged Out    HPV Vaccines  Aged Out         Wt Readings from Last 3 Encounters:   07/21/23 108 kg (238 lb)   05/12/23 105 kg (232 lb)   03/15/23 108 kg (238 lb)       Temp Readings from Last 3 Encounters:   07/21/23 36.4 °C (97.5 °F) (Temporal)   05/12/23 36.3 °C (97.4 °F) (Temporal)   03/15/23 35.9 °C (96.6 °F)     BP Readings from Last 3 Encounters:   07/21/23 108/58   05/12/23 108/58   03/15/23 147/67     Pulse Readings from Last 3 Encounters:   07/21/23 74   05/12/23 57   03/15/23 86     PHYSICAL EXAMINATION:      -----------------------------------------------------------------------------------------------------  GENERAL:  The patient appears nourished     &  normal affect.      No acute distress.     Alert and oriented times 3.     No obvious skin rashes or lesions.    No gait disturbance.      HEENT:   Normocephalic, atraumatic.    Normal speech    Extraocular eye motions intact and pain free.                 Pupils reactive and equally round. Conjunctivae clear    TMs normal    No erythema pharynx      NECK:  No masses or adenopathy palpable.  No  asymmetry visible.     No thyromegaly.    No bruits.       RESPIRATORY:  Equal breath sounds / no acute respiratory distress.      No wheezes,rales, or rhonchi        HEART:  Regular rhythm without murmur, rub or gallop.       ABDOMEN:  Soft, nontender.   No masses, guarding or rebound.     Normoactive bowel sounds overwt  .       EXTREMITIES:   Mild chronic edema in any extremity. Non pitting          No cyanosis or clubbing.      2+ dorsalis pedis pulses bilaterally    Fflex abd and int rotation WNL  Paraspinal c spine mild pain   FOOT EXAM:    A comprehensive foot exam was declined denies lesions    No obvious neuropathy.    1. 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 Essential hypertension        3. Mixed hyperlipidemia        4. Type 2 diabetes mellitus with other specified complication, without long-term current use of insulin (CMS/HCC)        5. Gastroesophageal reflux disease, unspecified whether esophagitis present        6. Chronic renal impairment, unspecified CKD stage        7. Anemia in stage 3b chronic kidney disease        8. Hemiparesis of left dominant side as late effect of cerebral infarction (CMS/HCC)        9. Ischemic stroke (CMS/HCC)        10. Chronic kidney disease, stage 4 (severe) (CMS/HCC)        11. Stricture of artery (CMS/HCC)        Likely djd neck no nuchal rigidity but some djd evident  Advised c spine series  And also shoulder xr perhaps torn RC      And again had a lengthy discussion w pt  about risks of poorly controlled diabetes including micro and macrovascular complications of DM2 including blindness,MI,CVA and death among other possibilities. Pt aware and agrees to better compliance and adherance to instructions such as regular eye exams q 1-2 y, foot exams,and f/u regularly for hba1c with a goal of 6.5.    NO evidence of neuropathy or nephropathy at this point    Follow up as planned for hba1c and BP checks  REGULARLY.    Sooner if condition deteriorates or problems arise  4mo labs again and appt    Jaimee Monte MD

## 2023-07-25 DIAGNOSIS — R97.20 ELEVATED PSA: ICD-10-CM

## 2023-08-24 DIAGNOSIS — E11.69 TYPE 2 DIABETES MELLITUS WITH OTHER SPECIFIED COMPLICATION, WITHOUT LONG-TERM CURRENT USE OF INSULIN (MULTI): ICD-10-CM

## 2023-08-25 RX ORDER — SITAGLIPTIN 50 MG/1
50 TABLET, FILM COATED ORAL DAILY
Qty: 90 TABLET | Refills: 3 | Status: SHIPPED | OUTPATIENT
Start: 2023-08-25

## 2023-09-11 DIAGNOSIS — I10 ESSENTIAL HYPERTENSION: Primary | ICD-10-CM

## 2023-09-11 RX ORDER — AMLODIPINE BESYLATE 5 MG/1
5 TABLET ORAL EVERY 24 HOURS
Qty: 90 TABLET | Refills: 0 | Status: SHIPPED | OUTPATIENT
Start: 2023-09-11 | End: 2023-11-17

## 2023-09-19 LAB
ACTIVATED PARTIAL THROMBOPLASTIN TIME IN PPP BY COAGULATION ASSAY: 29 SEC (ref 27–38)
ALANINE AMINOTRANSFERASE (SGPT) (U/L) IN SER/PLAS: 9 U/L (ref 10–52)
ALBUMIN (G/DL) IN SER/PLAS: 3.7 G/DL (ref 3.4–5)
ALBUMIN (MG/L) IN URINE: 9.9 MG/L
ALBUMIN/CREATININE (UG/MG) IN URINE: 10 UG/MG CRT (ref 0–30)
ALKALINE PHOSPHATASE (U/L) IN SER/PLAS: 65 U/L (ref 33–136)
ANION GAP IN SER/PLAS: 15 MMOL/L (ref 10–20)
ASPARTATE AMINOTRANSFERASE (SGOT) (U/L) IN SER/PLAS: 13 U/L (ref 9–39)
BASOPHILS (10*3/UL) IN BLOOD BY AUTOMATED COUNT: 0.1 X10E9/L (ref 0–0.1)
BASOPHILS/100 LEUKOCYTES IN BLOOD BY AUTOMATED COUNT: 1.3 % (ref 0–2)
BILIRUBIN TOTAL (MG/DL) IN SER/PLAS: 0.6 MG/DL (ref 0–1.2)
CALCIUM (MG/DL) IN SER/PLAS: 8.9 MG/DL (ref 8.6–10.3)
CARBON DIOXIDE, TOTAL (MMOL/L) IN SER/PLAS: 26 MMOL/L (ref 21–32)
CHLORIDE (MMOL/L) IN SER/PLAS: 104 MMOL/L (ref 98–107)
CREATININE (MG/DL) IN SER/PLAS: 2.49 MG/DL (ref 0.5–1.3)
CREATININE (MG/DL) IN URINE: 99 MG/DL (ref 20–370)
EOSINOPHILS (10*3/UL) IN BLOOD BY AUTOMATED COUNT: 0.44 X10E9/L (ref 0–0.4)
EOSINOPHILS/100 LEUKOCYTES IN BLOOD BY AUTOMATED COUNT: 5.7 % (ref 0–6)
ERYTHROCYTE DISTRIBUTION WIDTH (RATIO) BY AUTOMATED COUNT: 15.8 % (ref 11.5–14.5)
ERYTHROCYTE MEAN CORPUSCULAR HEMOGLOBIN CONCENTRATION (G/DL) BY AUTOMATED: 31.3 G/DL (ref 32–36)
ERYTHROCYTE MEAN CORPUSCULAR VOLUME (FL) BY AUTOMATED COUNT: 79 FL (ref 80–100)
ERYTHROCYTES (10*6/UL) IN BLOOD BY AUTOMATED COUNT: 4.54 X10E12/L (ref 4.5–5.9)
ESTIMATED AVERAGE GLUCOSE FOR HBA1C: 148 MG/DL
GFR MALE: 25 ML/MIN/1.73M2
GLUCOSE (MG/DL) IN SER/PLAS: 133 MG/DL (ref 74–99)
HEMATOCRIT (%) IN BLOOD BY AUTOMATED COUNT: 35.8 % (ref 41–52)
HEMOGLOBIN (G/DL) IN BLOOD: 11.2 G/DL (ref 13.5–17.5)
HEMOGLOBIN A1C/HEMOGLOBIN TOTAL IN BLOOD: 6.8 %
IMMATURE GRANULOCYTES/100 LEUKOCYTES IN BLOOD BY AUTOMATED COUNT: 0.4 % (ref 0–0.9)
INR IN PPP BY COAGULATION ASSAY: 1 (ref 0.9–1.1)
LEUKOCYTES (10*3/UL) IN BLOOD BY AUTOMATED COUNT: 7.8 X10E9/L (ref 4.4–11.3)
LYMPHOCYTES (10*3/UL) IN BLOOD BY AUTOMATED COUNT: 1.81 X10E9/L (ref 0.8–3)
LYMPHOCYTES/100 LEUKOCYTES IN BLOOD BY AUTOMATED COUNT: 23.3 % (ref 13–44)
MONOCYTES (10*3/UL) IN BLOOD BY AUTOMATED COUNT: 0.61 X10E9/L (ref 0.05–0.8)
MONOCYTES/100 LEUKOCYTES IN BLOOD BY AUTOMATED COUNT: 7.9 % (ref 2–10)
NEUTROPHILS (10*3/UL) IN BLOOD BY AUTOMATED COUNT: 4.78 X10E9/L (ref 1.6–5.5)
NEUTROPHILS/100 LEUKOCYTES IN BLOOD BY AUTOMATED COUNT: 61.4 % (ref 40–80)
PARATHYRIN INTACT (PG/ML) IN SER/PLAS: 111.7 PG/ML (ref 18.5–88)
PHOSPHATE (MG/DL) IN SER/PLAS: 3.3 MG/DL (ref 2.5–4.9)
PLATELETS (10*3/UL) IN BLOOD AUTOMATED COUNT: 187 X10E9/L (ref 150–450)
POTASSIUM (MMOL/L) IN SER/PLAS: 3.8 MMOL/L (ref 3.5–5.3)
PROTEIN TOTAL: 6.6 G/DL (ref 6.4–8.2)
PROTHROMBIN TIME (PT) IN PPP BY COAGULATION ASSAY: 11.7 SEC (ref 9.8–12.8)
SODIUM (MMOL/L) IN SER/PLAS: 141 MMOL/L (ref 136–145)
UREA NITROGEN (MG/DL) IN SER/PLAS: 35 MG/DL (ref 6–23)

## 2023-10-24 DIAGNOSIS — I10 ESSENTIAL HYPERTENSION: ICD-10-CM

## 2023-10-24 RX ORDER — TERAZOSIN 1 MG/1
1 CAPSULE ORAL EVERY 12 HOURS
Qty: 180 CAPSULE | Refills: 3 | Status: SHIPPED | OUTPATIENT
Start: 2023-10-24

## 2023-10-27 PROBLEM — E66.9 CLASS 2 OBESITY WITH BODY MASS INDEX (BMI) OF 37.0 TO 37.9 IN ADULT: Status: ACTIVE | Noted: 2023-10-27

## 2023-10-27 PROBLEM — I63.9: Status: ACTIVE | Noted: 2023-10-27

## 2023-10-27 PROBLEM — L21.9 SEBORRHEIC DERMATITIS: Status: ACTIVE | Noted: 2023-10-27

## 2023-10-27 PROBLEM — E66.812 CLASS 2 OBESITY WITH BODY MASS INDEX (BMI) OF 37.0 TO 37.9 IN ADULT: Status: ACTIVE | Noted: 2023-10-27

## 2023-10-27 PROBLEM — R30.0 DYSURIA: Status: ACTIVE | Noted: 2023-10-27

## 2023-10-27 PROBLEM — E11.42 TYPE 2 DIABETES MELLITUS WITH DIABETIC POLYNEUROPATHY (MULTI): Status: ACTIVE | Noted: 2022-07-12

## 2023-10-27 PROBLEM — E66.01 CLASS 2 SEVERE OBESITY WITH SERIOUS COMORBIDITY AND BODY MASS INDEX (BMI) OF 38.0 TO 38.9 IN ADULT (MULTI): Status: ACTIVE | Noted: 2023-10-27

## 2023-10-27 PROBLEM — E66.9 CLASS 2 OBESITY WITH BODY MASS INDEX (BMI) OF 38.0 TO 38.9 IN ADULT: Status: ACTIVE | Noted: 2023-10-27

## 2023-10-27 PROBLEM — E66.812 CLASS 2 SEVERE OBESITY WITH SERIOUS COMORBIDITY AND BODY MASS INDEX (BMI) OF 38.0 TO 38.9 IN ADULT: Status: ACTIVE | Noted: 2023-10-27

## 2023-10-27 PROBLEM — E66.01 SEVERE OBESITY WITH BODY MASS INDEX (BMI) OF 35.0 TO 39.9 WITH SERIOUS COMORBIDITY (MULTI): Status: ACTIVE | Noted: 2023-10-27

## 2023-10-27 PROBLEM — Z87.891 FORMER SMOKER: Status: ACTIVE | Noted: 2023-10-27

## 2023-10-27 PROBLEM — R00.2 PALPITATIONS: Status: ACTIVE | Noted: 2023-10-27

## 2023-10-27 PROBLEM — E66.812 CLASS 2 OBESITY WITH BODY MASS INDEX (BMI) OF 38.0 TO 38.9 IN ADULT: Status: ACTIVE | Noted: 2023-10-27

## 2023-10-27 RX ORDER — CETIRIZINE HYDROCHLORIDE 10 MG/1
10 TABLET ORAL DAILY
COMMUNITY
End: 2023-12-20 | Stop reason: WASHOUT

## 2023-10-27 RX ORDER — FLUOCINOLONE ACETONIDE 0.1 MG/ML
SOLUTION TOPICAL 2 TIMES DAILY
COMMUNITY

## 2023-10-30 ENCOUNTER — OFFICE VISIT (OUTPATIENT)
Dept: UROLOGY | Facility: CLINIC | Age: 83
End: 2023-10-30
Payer: MEDICARE

## 2023-10-30 VITALS
DIASTOLIC BLOOD PRESSURE: 70 MMHG | HEIGHT: 66 IN | WEIGHT: 232 LBS | HEART RATE: 80 BPM | BODY MASS INDEX: 37.28 KG/M2 | SYSTOLIC BLOOD PRESSURE: 150 MMHG | TEMPERATURE: 97.3 F

## 2023-10-30 DIAGNOSIS — R35.0 BENIGN PROSTATIC HYPERPLASIA WITH URINARY FREQUENCY: Primary | ICD-10-CM

## 2023-10-30 DIAGNOSIS — R97.20 ELEVATED PSA: ICD-10-CM

## 2023-10-30 DIAGNOSIS — N40.1 BENIGN PROSTATIC HYPERPLASIA WITH URINARY FREQUENCY: Primary | ICD-10-CM

## 2023-10-30 PROCEDURE — 3077F SYST BP >= 140 MM HG: CPT | Performed by: UROLOGY

## 2023-10-30 PROCEDURE — 1159F MED LIST DOCD IN RCRD: CPT | Performed by: UROLOGY

## 2023-10-30 PROCEDURE — 99204 OFFICE O/P NEW MOD 45 MIN: CPT | Performed by: UROLOGY

## 2023-10-30 PROCEDURE — 3078F DIAST BP <80 MM HG: CPT | Performed by: UROLOGY

## 2023-10-30 PROCEDURE — 1160F RVW MEDS BY RX/DR IN RCRD: CPT | Performed by: UROLOGY

## 2023-10-30 NOTE — PROGRESS NOTES
History Of Present Illness  83-year-old male referred to me for evaluation of elevated PSA  PMH 38 for BMI, hypertension, type 2 diabetes, GERD, enlarged prostate with lower urinary tract symptoms, prior CVA with hemiparesis 02/2019 and again 08/2022, PVD w/ b/l endovascular stents  Fhx: No Fhx breast or prostate ca  Former smoker  On Januvia, on Plavix qod  PSA history:  07/23: 7.1 (14.2 on finasteride)  No LUTS  PriorBx in [place], [place] - negative    Past Medical History  He has a past medical history of Colon cancer screening declined and History of CVA (cerebrovascular accident) (02/2019).    Surgical History  He has a past surgical history that includes MR angio head wo IV contrast (08/09/2022); MR angio neck wo IV contrast (08/09/2022); Colectomy (2006); Colonoscopy (2006); Gastrostomy tube placement; and Vascular surgery (2023).     Social History  He reports that he quit smoking about 33 years ago. His smoking use included cigarettes. He does not have any smokeless tobacco history on file. He reports that he does not currently use alcohol. He reports that he does not use drugs.    Family History  Family History   Problem Relation Name Age of Onset    Hypertension Mother      Stroke Father          Allergies  Shellfish containing products    ROS: 12 system review was completed and is negative with the exception of those signs and symptoms noted in the history of present illness: A 12 system review was completed and is negative with the exception of those signs and symptoms noted in the history of present illness.     Exam:  General: in NAD, appears stated age  Head: normocephalic, atraumatic  Respiratory: normal effort, no use of accessory muscles  Cardiovascular: no edema noted  Skin: normal turgor, no rashes  Neurologic: grossly intact, oriented to person/place/time  Psychiatric: mode and affect appropriate  ASHLEIGH - enlarged prostate 50-60g, smooth no nodules or induration     Last Recorded Vitals  There  were no vitals taken for this visit.    Lab Results   Component Value Date    PSASCREEN 7.10 (H) 07/21/2023    CREATININE 2.49 (H) 09/19/2023    HGB 11.2 (L) 09/19/2023         ASSESSMENT/PLAN:  #Enlarged prostate, without lower urinary tract symptoms  -Continue finasteride 5 mg daily    #Elevated PSA  -PSA of 14 adjusted on finasteride  -Patient has significant medical comorbidities including prior strokes x2, peripheral vascular disease with endovascular stents, limited mobility  -Life expectancy somewhat limited and therefore I will not proceed with aggressive prostate cancer evaluation  -I will follow-up PSA over time, in the absence of a quickly rising PSA, we will hold off any further evaluation  -PSA with free percentage in 6 months    Robin Smith MD

## 2023-10-31 ENCOUNTER — HOSPITAL ENCOUNTER (OUTPATIENT)
Dept: RADIOLOGY | Facility: HOSPITAL | Age: 83
End: 2023-10-31
Payer: MEDICARE

## 2023-10-31 DIAGNOSIS — R06.02 SHORTNESS OF BREATH: ICD-10-CM

## 2023-11-01 ENCOUNTER — HOSPITAL ENCOUNTER (OUTPATIENT)
Dept: RADIOLOGY | Facility: HOSPITAL | Age: 83
Discharge: HOME | End: 2023-11-01
Payer: MEDICARE

## 2023-11-01 PROCEDURE — 71046 X-RAY EXAM CHEST 2 VIEWS: CPT | Performed by: RADIOLOGY

## 2023-11-01 PROCEDURE — 71046 X-RAY EXAM CHEST 2 VIEWS: CPT | Mod: FY

## 2023-11-16 DIAGNOSIS — I10 ESSENTIAL HYPERTENSION: ICD-10-CM

## 2023-11-16 DIAGNOSIS — K21.9 GASTROESOPHAGEAL REFLUX DISEASE, UNSPECIFIED WHETHER ESOPHAGITIS PRESENT: ICD-10-CM

## 2023-11-17 DIAGNOSIS — I10 ESSENTIAL HYPERTENSION: ICD-10-CM

## 2023-11-17 RX ORDER — AMLODIPINE BESYLATE 5 MG/1
TABLET ORAL
Qty: 90 TABLET | Refills: 3 | Status: SHIPPED | OUTPATIENT
Start: 2023-11-17

## 2023-11-17 RX ORDER — VALSARTAN AND HYDROCHLOROTHIAZIDE 320; 25 MG/1; MG/1
1 TABLET, FILM COATED ORAL DAILY
Qty: 90 TABLET | Refills: 3 | Status: SHIPPED | OUTPATIENT
Start: 2023-11-17

## 2023-11-17 RX ORDER — PANTOPRAZOLE SODIUM 40 MG/1
40 TABLET, DELAYED RELEASE ORAL DAILY
Qty: 90 TABLET | Refills: 3 | Status: SHIPPED | OUTPATIENT
Start: 2023-11-17

## 2023-11-21 ENCOUNTER — OFFICE VISIT (OUTPATIENT)
Dept: PRIMARY CARE | Facility: CLINIC | Age: 83
End: 2023-11-21
Payer: MEDICARE

## 2023-11-21 VITALS
BODY MASS INDEX: 37.45 KG/M2 | TEMPERATURE: 97.5 F | RESPIRATION RATE: 16 BRPM | SYSTOLIC BLOOD PRESSURE: 128 MMHG | HEIGHT: 66 IN | HEART RATE: 76 BPM | WEIGHT: 233 LBS | DIASTOLIC BLOOD PRESSURE: 58 MMHG | OXYGEN SATURATION: 96 %

## 2023-11-21 DIAGNOSIS — N18.32 ANEMIA IN STAGE 3B CHRONIC KIDNEY DISEASE (MULTI): ICD-10-CM

## 2023-11-21 DIAGNOSIS — I63.9 ISCHEMIC STROKE (MULTI): ICD-10-CM

## 2023-11-21 DIAGNOSIS — I10 ESSENTIAL HYPERTENSION: ICD-10-CM

## 2023-11-21 DIAGNOSIS — E11.42 TYPE 2 DIABETES MELLITUS WITH DIABETIC POLYNEUROPATHY, WITHOUT LONG-TERM CURRENT USE OF INSULIN (MULTI): ICD-10-CM

## 2023-11-21 DIAGNOSIS — N18.4 CHRONIC KIDNEY DISEASE, STAGE 4 (SEVERE) (MULTI): ICD-10-CM

## 2023-11-21 DIAGNOSIS — L21.9 SEBORRHEIC DERMATITIS: Primary | ICD-10-CM

## 2023-11-21 DIAGNOSIS — R35.0 BENIGN PROSTATIC HYPERPLASIA WITH URINARY FREQUENCY: ICD-10-CM

## 2023-11-21 DIAGNOSIS — D63.1 ANEMIA IN STAGE 3B CHRONIC KIDNEY DISEASE (MULTI): ICD-10-CM

## 2023-11-21 DIAGNOSIS — I69.352 HEMIPARESIS OF LEFT DOMINANT SIDE AS LATE EFFECT OF CEREBRAL INFARCTION (MULTI): ICD-10-CM

## 2023-11-21 DIAGNOSIS — E78.2 MIXED HYPERLIPIDEMIA: ICD-10-CM

## 2023-11-21 DIAGNOSIS — N40.1 BENIGN PROSTATIC HYPERPLASIA WITH URINARY FREQUENCY: ICD-10-CM

## 2023-11-21 DIAGNOSIS — I63.9 CEREBROVASCULAR ACCIDENT (CVA) WITH INVOLVEMENT OF LEFT SIDE OF BODY (MULTI): ICD-10-CM

## 2023-11-21 PROCEDURE — 1160F RVW MEDS BY RX/DR IN RCRD: CPT | Performed by: FAMILY MEDICINE

## 2023-11-21 PROCEDURE — 99214 OFFICE O/P EST MOD 30 MIN: CPT | Performed by: FAMILY MEDICINE

## 2023-11-21 PROCEDURE — 1159F MED LIST DOCD IN RCRD: CPT | Performed by: FAMILY MEDICINE

## 2023-11-21 PROCEDURE — 3078F DIAST BP <80 MM HG: CPT | Performed by: FAMILY MEDICINE

## 2023-11-21 PROCEDURE — 3074F SYST BP LT 130 MM HG: CPT | Performed by: FAMILY MEDICINE

## 2023-11-21 RX ORDER — APIXABAN 5 MG/1
5 TABLET, FILM COATED ORAL 2 TIMES DAILY
COMMUNITY
Start: 2023-11-03

## 2023-11-21 NOTE — PROGRESS NOTES
Chief Complaint   Patient presents with    Diabetes    Hypertension    Hyperlipidemia       Juarez Chávez is here for a diabetic follow up    BFV5Z=3.8  Sees uro for psa elevation    LDL=31  Insurance rec statin-declines  Cho actually low  Neuro rec asa only  Vasc said plavix and asa-  Had epistaxis on plavix and asa  Changed last mo to eliquis-I am unclear why -by vascular-I advised them to clarify w cardio and vasc      Sugars most recently have been running-   HIGH & LOW <150   Activity level-     advised to increase  The patient denies history of       seizures,             +KNOWN CAD      +stroke.  L hemiparesis      No chest pain, shortness of breath, paroxysmal nocturnal dyspnea.     No nausea, vomiting, diarrhea, hematochezia or melena.      No paresthesias or headaches      No dysuria, frequency or hematuria.    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 (CMS/HCC)    BPH (benign prostatic hyperplasia)    Ischemic stroke (CMS/HCC)    Chronic renal insufficiency    Type 2 diabetes mellitus with diabetic polyneuropathy (CMS/HCC)    Essential hypertension    GERD (gastroesophageal reflux disease)    Gout    Hemiparesis of left dominant side as late effect of cerebral infarction (CMS/HCC)    Mixed hyperlipidemia    Obesity, morbid (CMS/HCC)    Non-pressure chronic ulcer of other part of right foot limited to breakdown of skin (CMS/HCC)    Chronic kidney disease, stage 4 (severe) (CMS/HCC)    Stricture of artery (CMS/HCC)    Cerebrovascular accident (CVA) with involvement of left side of body (CMS/HCC)    Class 2 obesity with body mass index (BMI) of 37.0 to 37.9 in adult    Class 2 obesity with body mass index (BMI) of 38.0 to 38.9 in adult    Class 2 severe obesity with serious comorbidity and body mass index (BMI) of 38.0 to 38.9 in adult (CMS/HCC)    Dysuria    Former smoker    Palpitations    Seborrheic dermatitis    Severe obesity with body mass index (BMI) of  35.0 to 39.9 with serious comorbidity (CMS/HCC)    Elevated PSA     Past Surgical History:   Procedure Laterality Date    COLECTOMY  2006    COLONOSCOPY  2006    after colectomy    GASTROSTOMY TUBE PLACEMENT      2019-2019    MR HEAD ANGIO WO IV CONTRAST  2022    MR HEAD ANGIO WO IV CONTRAST 2022 [facility] CLINICAL LEGACY    MR NECK ANGIO WO IV CONTRAST  2022    MR NECK ANGIO WO IV CONTRAST 2022 [facility] CLINICAL LEGACY    VASCULAR SURGERY      Stent placement B/L Lower Extremetry - 2023     Social History     Socioeconomic History    Marital status:      Spouse name: None    Number of children: None    Years of education: None    Highest education level: None   Occupational History    None   Tobacco Use    Smoking status: Former     Types: Cigarettes     Quit date:      Years since quittin.9    Smokeless tobacco: None   Substance and Sexual Activity    Alcohol use: Not Currently    Drug use: Never    Sexual activity: None   Other Topics Concern    None   Social History Narrative    None     Social Determinants of Health     Financial Resource Strain: Not on file   Food Insecurity: Not on file   Transportation Needs: Not on file   Physical Activity: Not on file   Stress: Not on file   Social Connections: Not on file   Intimate Partner Violence: Not on file   Housing Stability: Not on file     Lab on 10/31/2023   Component Date Value Ref Range Status    Protime 10/31/2023 12.0  9.8 - 12.8 seconds Final    INR 10/31/2023 1.1  0.9 - 1.1 Final    aPTT 10/31/2023 29  27 - 38 seconds Final    Glucose 10/31/2023 137 (H)  74 - 99 mg/dL Final    Sodium 10/31/2023 142  136 - 145 mmol/L Final    Potassium 10/31/2023 3.7  3.5 - 5.3 mmol/L Final    Chloride 10/31/2023 103  98 - 107 mmol/L Final    Bicarbonate 10/31/2023 27  21 - 32 mmol/L Final    Anion Gap 10/31/2023 16  10 - 20 mmol/L Final    Urea Nitrogen 10/31/2023 29 (H)  6 - 23 mg/dL Final    Creatinine 10/31/2023 2.35 (H)  0.50 -  1.30 mg/dL Final    eGFR 10/31/2023 27 (L)  >60 mL/min/1.73m*2 Final    Calculations of estimated GFR are performed using the 2021 CKD-EPI Study Refit equation without the race variable for the IDMS-Traceable creatinine methods.  https://jasn.asnjournals.org/content/early/2021/09/22/ASN.1133209754    Calcium 10/31/2023 8.8  8.6 - 10.3 mg/dL Final    Albumin 10/31/2023 3.7  3.4 - 5.0 g/dL Final    Alkaline Phosphatase 10/31/2023 60  33 - 136 U/L Final    Total Protein 10/31/2023 6.8  6.4 - 8.2 g/dL Final    AST 10/31/2023 11  9 - 39 U/L Final    Bilirubin, Total 10/31/2023 0.5  0.0 - 1.2 mg/dL Final    ALT 10/31/2023 8 (L)  10 - 52 U/L Final    Patients treated with Sulfasalazine may generate falsely decreased results for ALT.    WBC 10/31/2023 8.2  4.4 - 11.3 x10*3/uL Final    nRBC 10/31/2023 0.0  0.0 - 0.0 /100 WBCs Final    RBC 10/31/2023 4.55  4.50 - 5.90 x10*6/uL Final    Hemoglobin 10/31/2023 11.3 (L)  13.5 - 17.5 g/dL Final    Hematocrit 10/31/2023 35.5 (L)  41.0 - 52.0 % Final    MCV 10/31/2023 78 (L)  80 - 100 fL Final    MCH 10/31/2023 24.8 (L)  26.0 - 34.0 pg Final    MCHC 10/31/2023 31.8 (L)  32.0 - 36.0 g/dL Final    RDW 10/31/2023 15.9 (H)  11.5 - 14.5 % Final    Platelets 10/31/2023 181  150 - 450 x10*3/uL Final    MPV 10/31/2023 10.8  7.5 - 11.5 fL Final    Neutrophils % 10/31/2023 61.9  40.0 - 80.0 % Final    Immature Granulocytes %, Automated 10/31/2023 0.4  0.0 - 0.9 % Final    Immature Granulocyte Count (IG) includes promyelocytes, myelocytes and metamyelocytes but does not include bands. Percent differential counts (%) should be interpreted in the context of the absolute cell counts (cells/UL).    Lymphocytes % 10/31/2023 21.2  13.0 - 44.0 % Final    Monocytes % 10/31/2023 9.4  2.0 - 10.0 % Final    Eosinophils % 10/31/2023 6.0  0.0 - 6.0 % Final    Basophils % 10/31/2023 1.1  0.0 - 2.0 % Final    Neutrophils Absolute 10/31/2023 5.10  1.60 - 5.50 x10*3/uL Final    Percent differential counts (%)  should be interpreted in the context of the absolute cell counts (cells/uL).    Immature Granulocytes Absolute, Au* 10/31/2023 0.03  0.00 - 0.50 x10*3/uL Final    Lymphocytes Absolute 10/31/2023 1.74  0.80 - 3.00 x10*3/uL Final    Monocytes Absolute 10/31/2023 0.77  0.05 - 0.80 x10*3/uL Final    Eosinophils Absolute 10/31/2023 0.49 (H)  0.00 - 0.40 x10*3/uL Final    Basophils Absolute 10/31/2023 0.09  0.00 - 0.10 x10*3/uL Final   Legacy Encounter on 09/19/2023   Component Date Value Ref Range Status    Ventricular Rate 09/19/2023 56  BPM Final    Atrial Rate 09/19/2023 56  BPM Final    CT Interval 09/19/2023 180  ms Final    QRS Duration 09/19/2023 90  ms Final    QT Interval 09/19/2023 378  ms Final    QTC Calculation(Bazett) 09/19/2023 364  ms Final    P Axis 09/19/2023 76  degrees Final    R Axis 09/19/2023 44  degrees Final    T Axis 09/19/2023 38  degrees Final    QRS Count 09/19/2023 10  beats Final    Q Onset 09/19/2023 225  ms Final    P Onset 09/19/2023 135  ms Final    P Offset 09/19/2023 189  ms Final    T Offset 09/19/2023 414  ms Final    QTC Fredericia 09/19/2023 369  ms Final   Orders Only on 09/19/2023   Component Date Value Ref Range Status    ALBUMIN (MG/L) IN URINE 09/19/2023 9.9  Not Established mg/L Final    Albumin/Creatine Ratio 09/19/2023 10.0  0.0 - 30.0 ug/mg crt Final    Creatinine, Urine 09/19/2023 99.0  20.0 - 370.0 mg/dL Final    PTH 09/19/2023 111.7 (H)  18.5 - 88.0 pg/mL Final    Hemoglobin A1C 09/19/2023 6.8 (A)  % Final    Comment:      Diagnosis of Diabetes-Adults   Non-Diabetic: < or = 5.6%   Increased risk for developing diabetes: 5.7-6.4%   Diagnostic of diabetes: > or = 6.5%  .       Monitoring of Diabetes                Age (y)     Therapeutic Goal (%)   Adults:          >18           <7.0   Pediatrics:    13-18           <7.5                   7-12           <8.0                   0- 6            7.5-8.5   American Diabetes Association. Diabetes Care 33(S1), Jan 2010.       Estimated Average Glucose 09/19/2023 148  MG/DL Final    Protime 09/19/2023 11.7  9.8 - 12.8 sec Final    Note new reference range as of 6/20/2023 at 10:00am.    INR 09/19/2023 1.0  0.9 - 1.1 Final    aPTT 09/19/2023 29  27 - 38 sec Final    Note new reference range as of 6/20/2023 at 10:00am.    Glucose 09/19/2023 133 (H)  74 - 99 mg/dL Final    Sodium 09/19/2023 141  136 - 145 mmol/L Final    Potassium 09/19/2023 3.8  3.5 - 5.3 mmol/L Final    Chloride 09/19/2023 104  98 - 107 mmol/L Final    Bicarbonate 09/19/2023 26  21 - 32 mmol/L Final    Anion Gap 09/19/2023 15  10 - 20 mmol/L Final    Urea Nitrogen 09/19/2023 35 (H)  6 - 23 mg/dL Final    Creatinine 09/19/2023 2.49 (H)  0.50 - 1.30 mg/dL Final    GFR MALE 09/19/2023 25 (A)  >90 mL/min/1.73m2 Final    Comment:  CALCULATIONS OF ESTIMATED GFR ARE PERFORMED   USING THE 2021 CKD-EPI STUDY REFIT EQUATION   WITHOUT THE RACE VARIABLE FOR THE IDMS-TRACEABLE   CREATININE METHODS.    https://jasn.asnjournals.org/content/early/2021/09/22/ASN.7945440297      Calcium 09/19/2023 8.9  8.6 - 10.3 mg/dL Final    Albumin 09/19/2023 3.7  3.4 - 5.0 g/dL Final    Alkaline Phosphatase 09/19/2023 65  33 - 136 U/L Final    Total Protein 09/19/2023 6.6  6.4 - 8.2 g/dL Final    AST 09/19/2023 13  9 - 39 U/L Final    Total Bilirubin 09/19/2023 0.6  0.0 - 1.2 mg/dL Final    ALT (SGPT) 09/19/2023 9 (L)  10 - 52 U/L Final    Comment:  Patients treated with Sulfasalazine may generate    falsely decreased results for ALT.      WBC 09/19/2023 7.8  4.4 - 11.3 x10E9/L Final    RBC 09/19/2023 4.54  4.50 - 5.90 x10E12/L Final    Hemoglobin 09/19/2023 11.2 (L)  13.5 - 17.5 g/dL Final    Hematocrit 09/19/2023 35.8 (L)  41.0 - 52.0 % Final    MCV 09/19/2023 79 (L)  80 - 100 fL Final    MCHC 09/19/2023 31.3 (L)  32.0 - 36.0 g/dL Final    Platelets 09/19/2023 187  150 - 450 x10E9/L Final    RDW 09/19/2023 15.8 (H)  11.5 - 14.5 % Final    Neutrophils % 09/19/2023 61.4  40.0 - 80.0 % Final    Immature  Granulocytes %, Automated 09/19/2023 0.4  0.0 - 0.9 % Final    Comment:  Immature Granulocyte Count (IG) includes promyelocytes,    myelocytes and metamyelocytes but does not include bands.   Percent differential counts (%) should be interpreted in the   context of the absolute cell counts (cells/L).      Lymphocytes % 09/19/2023 23.3  13.0 - 44.0 % Final    Monocytes % 09/19/2023 7.9  2.0 - 10.0 % Final    Eosinophils % 09/19/2023 5.7  0.0 - 6.0 % Final    Basophils % 09/19/2023 1.3  0.0 - 2.0 % Final    Neutrophils Absolute 09/19/2023 4.78  1.60 - 5.50 x10E9/L Final    Lymphocytes Absolute 09/19/2023 1.81  0.80 - 3.00 x10E9/L Final    Monocytes Absolute 09/19/2023 0.61  0.05 - 0.80 x10E9/L Final    Eosinophils Absolute 09/19/2023 0.44 (H)  0.00 - 0.40 x10E9/L Final    Basophils Absolute 09/19/2023 0.10  0.00 - 0.10 x10E9/L Final    Phosphorus 09/19/2023 3.3  2.5 - 4.9 mg/dL Final    Comment:  The performance characteristics of phosphorus testing in   heparinized plasma have been validated by the individual     laboratory site where testing is performed. Testing    on heparinized plasma is not approved by the FDA;    however, such approval is not necessary.          Health Maintenance   Topic Date Due    COVID-19 Vaccine (1) Never done    Pneumococcal Vaccine: 65+ Years (1 - PCV) Never done    Diabetes: Foot Exam  Never done    Diabetes: Retinopathy Screening  Never done    DTaP/Tdap/Td Vaccines (1 - Tdap) Never done    Zoster Vaccines (1 of 2) Never done    Influenza Vaccine (1) Never done    Diabetes: Hemoglobin A1C  12/19/2023    Medicare Annual Wellness Visit (AWV)  05/13/2024    Lipid Panel  07/21/2024    Diabetes: Urine Protein Screening  09/19/2024    HIB Vaccines  Aged Out    Hepatitis B Vaccines  Aged Out    IPV Vaccines  Aged Out    Hepatitis A Vaccines  Aged Out    Meningococcal Vaccine  Aged Out    Rotavirus Vaccines  Aged Out    HPV Vaccines  Aged Out         Wt Readings from Last 3 Encounters:    11/21/23 106 kg (233 lb)   10/30/23 105 kg (232 lb)   07/21/23 108 kg (238 lb)       Temp Readings from Last 3 Encounters:   11/21/23 36.4 °C (97.5 °F) (Temporal)   10/30/23 36.3 °C (97.3 °F) (Temporal)   07/21/23 36.4 °C (97.5 °F) (Temporal)     BP Readings from Last 3 Encounters:   11/21/23 128/58   10/30/23 150/70   07/21/23 108/58     Pulse Readings from Last 3 Encounters:   11/21/23 76   10/30/23 80   07/21/23 74     PHYSICAL EXAMINATION:      -----------------------------------------------------------------------------------------------------  GENERAL:  The patient appears nourished     &  normal affect.      No acute distress.     Alert and oriented times 3.     No obvious skin rashes or lesions.    Uses walker at home wheel chair at home      HEENT:   Normocephalic, atraumatic.    Normal speech    Extraocular eye motions intact and pain free.                 Pupils reactive and equally round. Conjunctivae clear    TMs normal    No erythema pharynx      NECK:  No masses or adenopathy palpable.  No asymmetry visible.     No thyromegaly.    No bruits.       RESPIRATORY:  Equal breath sounds / no acute respiratory distress.      No wheezes,rales, or rhonchi        HEART:  Regular rhythm without murmur, rub or gallop.       ABDOMEN:  Soft, nontender.   No masses, guarding or rebound.     Normoactive bowel sounds overwt.       EXTREMITIES:   1+ edema BLEs           No cyanosis or clubbing.      2+ dorsalis pedis pulses bilaterally    In wheel chair      FOOT EXAM:    A comprehensive foot exam was declined denies lesions    1. Essential hypertension        2. Mixed hyperlipidemia        3. Type 2 diabetes mellitus with diabetic polyneuropathy, without long-term current use of insulin (CMS/[facility])        4. Benign prostatic hyperplasia with urinary frequency        5. Chronic kidney disease, stage 4 (severe) (CMS/HCC)        6. Anemia in stage 3b chronic kidney disease (CMS/HCC)        7. Cerebrovascular accident  (CVA) with involvement of left side of body (CMS/HCC)        8. Hemiparesis of left dominant side as late effect of cerebral infarction (CMS/HCC)        9. Ischemic stroke (CMS/HCC)          Labs and appt 4mo  Sees vasc  Sees cardio  NOHC  Sees nephro  Sees uro  Sees neuro  Sees ENT    And again had a lengthy discussion w pt  about risks of poorly controlled diabetes including micro and macrovascular complications of DM2 including blindness,MI,CVA and death among other possibilities. Pt aware and agrees to better compliance and adherance to instructions such as regular eye exams q 1-2 y, foot exams,and f/u regularly for hba1c with a goal of 6.5.    NO evidence of neuropathy or nephropathy at this point    Follow up as planned for hba1c and BP checks REGULARLY.    Sooner if condition deteriorates or problems arise    Jaimee Monte MD

## 2023-11-22 DIAGNOSIS — R53.83 OTHER FATIGUE: ICD-10-CM

## 2023-11-22 DIAGNOSIS — M79.604 PAIN IN RIGHT LEG: ICD-10-CM

## 2023-11-22 DIAGNOSIS — I73.9 PERIPHERAL VASCULAR DISEASE, UNSPECIFIED (CMS-HCC): Primary | ICD-10-CM

## 2023-11-22 DIAGNOSIS — I87.1 COMPRESSION OF VEIN: ICD-10-CM

## 2023-11-22 DIAGNOSIS — M79.603 PAIN IN ARM, UNSPECIFIED: ICD-10-CM

## 2023-11-22 DIAGNOSIS — R06.02 SHORTNESS OF BREATH: ICD-10-CM

## 2023-12-20 ENCOUNTER — OFFICE VISIT (OUTPATIENT)
Dept: CARDIOLOGY | Facility: CLINIC | Age: 83
End: 2023-12-20
Payer: MEDICARE

## 2023-12-20 VITALS
HEART RATE: 66 BPM | SYSTOLIC BLOOD PRESSURE: 110 MMHG | BODY MASS INDEX: 39.09 KG/M2 | WEIGHT: 229 LBS | DIASTOLIC BLOOD PRESSURE: 70 MMHG | HEIGHT: 64 IN

## 2023-12-20 DIAGNOSIS — E66.01 SEVERE OBESITY WITH BODY MASS INDEX (BMI) OF 35.0 TO 39.9 WITH SERIOUS COMORBIDITY (MULTI): ICD-10-CM

## 2023-12-20 DIAGNOSIS — I73.9 PVD (PERIPHERAL VASCULAR DISEASE) (CMS-HCC): ICD-10-CM

## 2023-12-20 DIAGNOSIS — Z87.891 FORMER SMOKER: ICD-10-CM

## 2023-12-20 DIAGNOSIS — I63.9 CEREBROVASCULAR ACCIDENT (CVA) WITH INVOLVEMENT OF LEFT SIDE OF BODY (MULTI): ICD-10-CM

## 2023-12-20 DIAGNOSIS — E78.2 MIXED HYPERLIPIDEMIA: ICD-10-CM

## 2023-12-20 DIAGNOSIS — I10 ESSENTIAL HYPERTENSION: ICD-10-CM

## 2023-12-20 DIAGNOSIS — N18.4 CHRONIC KIDNEY DISEASE, STAGE 4 (SEVERE) (MULTI): ICD-10-CM

## 2023-12-20 DIAGNOSIS — E11.42 TYPE 2 DIABETES MELLITUS WITH DIABETIC POLYNEUROPATHY, WITHOUT LONG-TERM CURRENT USE OF INSULIN (MULTI): ICD-10-CM

## 2023-12-20 DIAGNOSIS — R00.2 PALPITATIONS: ICD-10-CM

## 2023-12-20 DIAGNOSIS — I47.10 PAROXYSMAL SUPRAVENTRICULAR TACHYCARDIA (CMS-HCC): ICD-10-CM

## 2023-12-20 PROCEDURE — 99214 OFFICE O/P EST MOD 30 MIN: CPT | Performed by: INTERNAL MEDICINE

## 2023-12-20 PROCEDURE — 3078F DIAST BP <80 MM HG: CPT | Performed by: INTERNAL MEDICINE

## 2023-12-20 PROCEDURE — 1160F RVW MEDS BY RX/DR IN RCRD: CPT | Performed by: INTERNAL MEDICINE

## 2023-12-20 PROCEDURE — 1159F MED LIST DOCD IN RCRD: CPT | Performed by: INTERNAL MEDICINE

## 2023-12-20 PROCEDURE — 3074F SYST BP LT 130 MM HG: CPT | Performed by: INTERNAL MEDICINE

## 2023-12-20 NOTE — PROGRESS NOTES
CARDIOLOGY OFFICE VISIT      CHIEF COMPLAINT  Chief Complaint   Patient presents with    Follow-up     1 year follow up        HISTORY OF PRESENT ILLNESS  Patient has 83-year-old  male with past medical history significant for cerebrovascular accident with left-sided hemiparesis 2019, recurrent cerebrovascular accident with dysarthria , hypertension, hyperlipidemia, diabetes mellitus, chronic renal insufficiency, anemia, gout, gastroesophageal reflux disease, obesity who presents for cardiac evaluation.      He is got chronic lower extremity edema which is unchanged recently. Denies chest pain, palpitations, dyspnea, nausea, vomiting, dizziness, fever, chills.  Patient was having epistaxis on Plavix.  Plavix was started by his vascular surgeon after procedure.  His vascular surgeon changed him to Eliquis.  Patient apparently had bilateral lower extremity arterial stenting.  Details unavailable for review.  Is also had venous work.  He currently uses a pedal bike 2 times a week for 50 petals.          Past surgical history:  Cholectomy neck  Colostomy  Gastric tube insertion and removal     Social history:  Quit smoking   Quit drinking alcohol      Family history:  Mother  with hypertension  Father  cerebrovascular accident     Review of systems have been reviewed and are negative, noncontributory, or as previously mentioned x12 systems.     I personally reviewed EKG 2022: Sinus bradycardia 59 bpm.     Assessment:  Paroxysmal supraventricular tachycardia, rates 120 bpm  Cerebrovascular accident with left-sided hemiparesis 2019  Cerebrovascular accident with dysarthria 2022  Peripheral arterial disease?  (Possible bilateral lower extremity PCI-details unknown)-placed on anticoagulation Dr.Barry Langford  Venous insufficiency-bilateral lower limb extremity venous procedures  Hypertension  Hyperlipidemia  Diabetes mellitus  Chronic renal  insufficiency-Dr. Solis  BPH  Anemia  Gastroesophageal reflux disease  Gout  Obesity     Recommendations:  Patient appears to be stable from a cardiac standpoint.  No symptoms of angina.  No symptomatic arrhythmia.  No evidence of heart failure.  Blood pressure under control.  I have asked the patient and wife to bring in copies of any vascular surgeries that have been done.  Apparently they were done in the office so it is not clear that the patient had arterial PCI.  No bleeding on high risk medical therapy.  Follow-up with myself in 1 year.     Please excuse any errors in grammar or translation related to this dictation. Voice recognition software was utilized to prepare this document.        Recent Cardiovascular Testing:  The following results have been reviewed and updated.   7/21/23  1.T C 94, HDL 32, LDL 31, Trig     Echo 8/9/22  1.EF 60b%  2.Bubble Study Negative.  3.Mild MR  4.Left atrium is mild to moderately dilated.        PVR 11/2/22  1. Mild     MARY 11/3/22  1. EF 60-65%  2. Left atrium is severely dilated  3. Mild to moderate mitral valve regurgitation  4. No left atrial thrombus  5. No evidence of a PFO     30 day event monitor 11/4/22  1. One episode of PSVT, 22 beat duration, rate 128 bpm  2. No evidence of atrial fibrillation         VITALS  Vitals:    12/20/23 1401   BP: 110/70   Pulse: 66     Wt Readings from Last 4 Encounters:   12/20/23 104 kg (229 lb)   11/21/23 106 kg (233 lb)   10/30/23 105 kg (232 lb)   07/21/23 108 kg (238 lb)       PHYSICAL EXAM:  GENERAL:  Well developed, well nourished, in no acute distress.  CHEST:  Symmetric and nontender.  NEURO/PSYCH:  Alert and oriented times three with approppriate behavior and responses.  NECK:  Supple, no JVD, no bruit.  LUNGS:  Clear to auscultation bilaterally, normal respiratory effort.  HEART:  Rate and rhythm REGULAR with no evident murmur, no gallop appreciated.    There are no rubs, clicks or heaves.  EXTREMITIES:  Warm with good color,  no clubbing or cyanosis.  There is no edema noted.  PERIPHERAL VASCULAR:  Pulses present and equally palpable; 2+ throughout.    ASSESSMENT AND PLAN  Problem List Items Addressed This Visit       Type 2 diabetes mellitus with diabetic polyneuropathy (CMS/[facility])    Essential hypertension    Mixed hyperlipidemia    Chronic kidney disease, stage 4 (severe) (CMS/[facility])    Cerebrovascular accident (CVA) with involvement of left side of body (CMS/[facility])    Former smoker    Palpitations    Severe obesity with body mass index (BMI) of 35.0 to 39.9 with serious comorbidity (CMS/[facility])    PVD (peripheral vascular disease) (CMS/[facility])    Paroxysmal supraventricular tachycardia       Past Medical History  Past Medical History:   Diagnosis Date    Colon cancer screening declined     History of CVA (cerebrovascular accident) 2019       Social History  Social History     Tobacco Use    Smoking status: Former     Types: Cigarettes     Quit date:      Years since quittin.9    Smokeless tobacco: Not on file   Substance Use Topics    Alcohol use: Not Currently    Drug use: Never       Family History     Family History   Problem Relation Name Age of Onset    Hypertension Mother      Stroke Father          Allergies:  Allergies   Allergen Reactions    Shellfish Containing Products Unknown        Outpatient Medications:  Current Outpatient Medications   Medication Instructions    albuterol 90 mcg/actuation inhaler 2 puffs, inhalation, Every 6 hours PRN    alpha tocopherol (Vitamin E) 670 mg (1,000 unit) capsule oral    amLODIPine (Norvasc) 5 mg tablet TAKE 1 TABLET BY MOUTH ONCE  EVERY 24 HOURS    ascorbic acid, vitamin C, 1,000 mg ER tablet 1 tablet, oral, Daily    atorvastatin (LIPITOR) 40 mg, oral, Daily    cholecalciferol, vitamin D3, 50 mcg (2,000 unit) tablet,chewable oral    Eliquis 5 mg, oral, 2 times daily    finasteride (PROSCAR) 5 mg, oral, Daily    fluocinolone (Synalar) 0.01 % external solution Topical, 2 times daily     fluocinolone 0.01 % shampoo 1 Applicatorful, topical (top), Every other day    garlic (GARLIQUE ORAL) as directed    Januvia 50 mg, oral, Daily    pantoprazole (PROTONIX) 40 mg, oral, Daily    potassium gluconate 595 mg (99 mg) tablet oral    terazosin (HYTRIN) 1 mg, oral, Every 12 hours    valsartan-hydrochlorothiazide (Diovan-HCT) 320-25 mg tablet 1 tablet, oral, Daily        Recent Lab Results:    CMP:    Lab Results   Component Value Date     10/31/2023    K 3.7 10/31/2023     10/31/2023    CO2 27 10/31/2023    BUN 29 (H) 10/31/2023    CREATININE 2.35 (H) 10/31/2023    GLUCOSE 137 (H) 10/31/2023    CALCIUM 8.8 10/31/2023       Magnesium:    Lab Results   Component Value Date    MG 1.80 08/09/2022       Lipid Profile:    Lab Results   Component Value Date    TRIG 154 (H) 07/21/2023    HDL 32.7 (A) 07/21/2023       TSH:    Lab Results   Component Value Date    TSH 2.42 03/17/2023       BNP:   Lab Results   Component Value Date    BNP 60 08/09/2022        PT/INR:    Lab Results   Component Value Date    PROTIME 12.0 10/31/2023    INR 1.1 10/31/2023       HgBA1c:    Lab Results   Component Value Date    HGBA1C 6.8 (A) 09/19/2023       BMP:  Lab Results   Component Value Date     10/31/2023     09/19/2023     07/21/2023     04/12/2023    K 3.7 10/31/2023    K 3.8 09/19/2023    K 3.8 07/21/2023    K 3.6 04/12/2023     10/31/2023     09/19/2023     (H) 07/21/2023     04/12/2023    CO2 27 10/31/2023    CO2 26 09/19/2023    CO2 28 07/21/2023    CO2 26 04/12/2023    BUN 29 (H) 10/31/2023    BUN 35 (H) 09/19/2023    BUN 39 (H) 07/21/2023    BUN 41 (H) 04/12/2023    CREATININE 2.35 (H) 10/31/2023    CREATININE 2.49 (H) 09/19/2023    CREATININE 2.39 (H) 07/21/2023    CREATININE 2.74 (H) 04/12/2023       CBC:  Lab Results   Component Value Date    WBC 8.2 10/31/2023    WBC 7.8 09/19/2023    WBC 7.8 07/21/2023    WBC 8.1 04/12/2023    RBC 4.55 10/31/2023    RBC 4.54  09/19/2023    RBC 4.39 (L) 07/21/2023    RBC 4.52 04/12/2023    HGB 11.3 (L) 10/31/2023    HGB 11.2 (L) 09/19/2023    HGB 10.8 (L) 07/21/2023    HGB 11.5 (L) 04/12/2023    HCT 35.5 (L) 10/31/2023    HCT 35.8 (L) 09/19/2023    HCT 34.4 (L) 07/21/2023    HCT 36.9 (L) 04/12/2023    MCV 78 (L) 10/31/2023    MCV 79 (L) 09/19/2023    MCV 78 (L) 07/21/2023    MCV 82 04/12/2023    MCH 24.8 (L) 10/31/2023    MCHC 31.8 (L) 10/31/2023    MCHC 31.3 (L) 09/19/2023    MCHC 31.4 (L) 07/21/2023    MCHC 31.2 (L) 04/12/2023    RDW 15.9 (H) 10/31/2023    RDW 15.8 (H) 09/19/2023    RDW 15.7 (H) 07/21/2023    RDW 15.1 (H) 04/12/2023     10/31/2023     09/19/2023     07/21/2023     04/12/2023    MPV 10.8 10/31/2023       Cardiac Enzymes:    Lab Results   Component Value Date    TROPHS 7 08/08/2022       Hepatic Function Panel:    Lab Results   Component Value Date    ALKPHOS 60 10/31/2023    ALT 8 (L) 10/31/2023    AST 11 10/31/2023    PROT 6.8 10/31/2023    BILITOT 0.5 10/31/2023           Lester Perez DO

## 2024-02-21 ENCOUNTER — OFFICE VISIT (OUTPATIENT)
Dept: PRIMARY CARE | Facility: CLINIC | Age: 84
End: 2024-02-21
Payer: MEDICARE

## 2024-02-21 VITALS
TEMPERATURE: 97.5 F | OXYGEN SATURATION: 94 % | HEART RATE: 68 BPM | BODY MASS INDEX: 39.31 KG/M2 | HEIGHT: 64 IN | SYSTOLIC BLOOD PRESSURE: 106 MMHG | RESPIRATION RATE: 18 BRPM | DIASTOLIC BLOOD PRESSURE: 52 MMHG

## 2024-02-21 DIAGNOSIS — I73.9 PVD (PERIPHERAL VASCULAR DISEASE) (CMS-HCC): ICD-10-CM

## 2024-02-21 DIAGNOSIS — N18.32 ANEMIA IN STAGE 3B CHRONIC KIDNEY DISEASE (MULTI): ICD-10-CM

## 2024-02-21 DIAGNOSIS — E66.01 SEVERE OBESITY WITH BODY MASS INDEX (BMI) OF 35.0 TO 39.9 WITH SERIOUS COMORBIDITY (MULTI): ICD-10-CM

## 2024-02-21 DIAGNOSIS — N40.1 BENIGN PROSTATIC HYPERPLASIA WITH URINARY FREQUENCY: ICD-10-CM

## 2024-02-21 DIAGNOSIS — Z00.00 ENCOUNTER FOR MEDICARE ANNUAL WELLNESS EXAM: ICD-10-CM

## 2024-02-21 DIAGNOSIS — I10 ESSENTIAL HYPERTENSION: ICD-10-CM

## 2024-02-21 DIAGNOSIS — E78.2 MIXED HYPERLIPIDEMIA: ICD-10-CM

## 2024-02-21 DIAGNOSIS — L97.511 NON-PRESSURE CHRONIC ULCER OF OTHER PART OF RIGHT FOOT LIMITED TO BREAKDOWN OF SKIN (MULTI): ICD-10-CM

## 2024-02-21 DIAGNOSIS — I69.352 HEMIPARESIS OF LEFT DOMINANT SIDE AS LATE EFFECT OF CEREBRAL INFARCTION (MULTI): ICD-10-CM

## 2024-02-21 DIAGNOSIS — E11.42 TYPE 2 DIABETES MELLITUS WITH DIABETIC POLYNEUROPATHY, WITHOUT LONG-TERM CURRENT USE OF INSULIN (MULTI): ICD-10-CM

## 2024-02-21 DIAGNOSIS — D63.1 ANEMIA IN STAGE 3B CHRONIC KIDNEY DISEASE (MULTI): ICD-10-CM

## 2024-02-21 DIAGNOSIS — I63.9 CEREBROVASCULAR ACCIDENT (CVA) WITH INVOLVEMENT OF LEFT SIDE OF BODY (MULTI): ICD-10-CM

## 2024-02-21 DIAGNOSIS — N18.4 CHRONIC KIDNEY DISEASE, STAGE 4 (SEVERE) (MULTI): ICD-10-CM

## 2024-02-21 DIAGNOSIS — R35.0 BENIGN PROSTATIC HYPERPLASIA WITH URINARY FREQUENCY: ICD-10-CM

## 2024-02-21 DIAGNOSIS — Z00.00 ROUTINE GENERAL MEDICAL EXAMINATION AT HEALTH CARE FACILITY: ICD-10-CM

## 2024-02-21 LAB — POC HEMOGLOBIN A1C: 7.5 % (ref 4.2–6.5)

## 2024-02-21 PROCEDURE — 3078F DIAST BP <80 MM HG: CPT | Performed by: FAMILY MEDICINE

## 2024-02-21 PROCEDURE — 1157F ADVNC CARE PLAN IN RCRD: CPT | Performed by: FAMILY MEDICINE

## 2024-02-21 PROCEDURE — 1160F RVW MEDS BY RX/DR IN RCRD: CPT | Performed by: FAMILY MEDICINE

## 2024-02-21 PROCEDURE — 3074F SYST BP LT 130 MM HG: CPT | Performed by: FAMILY MEDICINE

## 2024-02-21 PROCEDURE — 99397 PER PM REEVAL EST PAT 65+ YR: CPT | Performed by: FAMILY MEDICINE

## 2024-02-21 PROCEDURE — 1170F FXNL STATUS ASSESSED: CPT | Performed by: FAMILY MEDICINE

## 2024-02-21 PROCEDURE — 83036 HEMOGLOBIN GLYCOSYLATED A1C: CPT | Performed by: FAMILY MEDICINE

## 2024-02-21 PROCEDURE — 99497 ADVNCD CARE PLAN 30 MIN: CPT | Performed by: FAMILY MEDICINE

## 2024-02-21 PROCEDURE — G0439 PPPS, SUBSEQ VISIT: HCPCS | Performed by: FAMILY MEDICINE

## 2024-02-21 PROCEDURE — 1159F MED LIST DOCD IN RCRD: CPT | Performed by: FAMILY MEDICINE

## 2024-02-21 ASSESSMENT — ACTIVITIES OF DAILY LIVING (ADL)
DRESSING: INDEPENDENT
BATHING: INDEPENDENT
MANAGING_FINANCES: TOTAL CARE
DOING_HOUSEWORK: TOTAL CARE
GROCERY_SHOPPING: TOTAL CARE
TAKING_MEDICATION: INDEPENDENT

## 2024-02-21 ASSESSMENT — ENCOUNTER SYMPTOMS
OCCASIONAL FEELINGS OF UNSTEADINESS: 1
DEPRESSION: 0
LOSS OF SENSATION IN FEET: 0

## 2024-02-21 ASSESSMENT — PATIENT HEALTH QUESTIONNAIRE - PHQ9
2. FEELING DOWN, DEPRESSED OR HOPELESS: NOT AT ALL
1. LITTLE INTEREST OR PLEASURE IN DOING THINGS: NOT AT ALL
SUM OF ALL RESPONSES TO PHQ9 QUESTIONS 1 AND 2: 0

## 2024-02-21 NOTE — PROGRESS NOTES
Subjective   Reason for Visit: Juarez Chávez is a 84 y.o. male here for a Medicare Wellness visit.   DECLINES ALL VACCINES  Over 84  HIGH ASCVD    CHECKLIST REVIEWED AND COMPLETE FOR AMW    Past Medical, Surgical, and Family History reviewed and updated in chart.    Reviewed all medications by prescribing practitioner or clinical pharmacist (such as prescriptions, OTCs, herbal therapies and supplements) and documented in the medical record.  Medicare Annual Wellness Visit Subsequent, Diabetes, Chronic Kidney Disease, and Hand Pain (Pain and stiffness in right index finger-goes up wrist and arm)  HPI    Patient Self Assessment of Health Status  Patient Self Assessment: Good    Nutrition and Exercise  Current Diet: HEALTHY Diet always best, minimizing excess carbs  Adequate Fluid Intake: Yes  Caffeine: -aware to minimize intake  Exercise Frequency: Regularly advised    Functional Ability/Level of Safety  Cognitive Impairment Observed: No cognitive impairment observed    Home Safety Risk Factors: None    Patient Care Team:  Jaimee Monte MD as PCP - General    HPI  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 (CMS/HCC)    BPH (benign prostatic hyperplasia)    Ischemic stroke (CMS/HCC)    Chronic renal insufficiency    Type 2 diabetes mellitus with diabetic polyneuropathy (CMS/HCC)    Essential hypertension    GERD (gastroesophageal reflux disease)    Gout    Hemiparesis of left dominant side as late effect of cerebral infarction (CMS/HCC)    Mixed hyperlipidemia    Obesity, morbid (CMS/HCC)    Non-pressure chronic ulcer of other part of right foot limited to breakdown of skin (CMS/HCC)    Chronic kidney disease, stage 4 (severe) (CMS/HCC)    Stricture of artery (CMS/HCC)    Cerebrovascular accident (CVA) with involvement of left side of body (CMS/HCC)    Class 2 obesity with body mass index (BMI) of 37.0 to 37.9 in adult    Class 2 obesity with body mass index (BMI) of  "38.0 to 38.9 in adult    Class 2 severe obesity with serious comorbidity and body mass index (BMI) of 38.0 to 38.9 in adult (CMS/HCC)    Dysuria    Former smoker    Palpitations    Seborrheic dermatitis    Severe obesity with body mass index (BMI) of 35.0 to 39.9 with serious comorbidity (CMS/HCC)    Elevated PSA    PVD (peripheral vascular disease) (CMS/HCC)    Paroxysmal supraventricular tachycardia      Past Surgical History:   Procedure Laterality Date    COLECTOMY  2006    COLONOSCOPY  2006    after colectomy    GASTROSTOMY TUBE PLACEMENT      2/2019-8/2019    MR HEAD ANGIO WO IV CONTRAST  08/09/2022    MR HEAD ANGIO WO IV CONTRAST 8/9/2022 [facility] CLINICAL LEGACY    MR NECK ANGIO WO IV CONTRAST  08/09/2022    MR NECK ANGIO WO IV CONTRAST 8/9/2022 [facility] CLINICAL LEGACY    VASCULAR SURGERY  2023    Stent placement B/L Lower Extremetry - Feb 2023       Review of Systems no sz  +CVA  L face droop chronic    This patient has   NO history of recent Covid nor flu symptoms,  NO Fever nor chills,  NO Chest pain, shortness of breath nor paroxysmal nocturnal dyspnea,  NO Nausea, vomiting, nor diarrhea,  NO Hematochezia nor melena,  NO Dysuria, hematuria, nor new incontinence issues  NO new severe headaches nor neurological complaints,  NO new issues with anxiety nor depression nor new psychiatric complaints,  NO suicidal nor homicidal ideations.     OBJECTIVE:  /52   Pulse 68   Temp 36.4 °C (97.5 °F) (Temporal)   Resp 18   Ht 1.626 m (5' 4\")   SpO2 94%   BMI 39.31 kg/m²      General:  alert, oriented, no acute distress.  No obvious skin rashes noted.   No gait disturbance noted.    Mood is pleasant, not tearful, no signs of emotional distress.  Not appearing intoxicated or altered.   No voiced delusions,   Normal, appropriate behavior.    HEENT: Normocephalic, atraumatic,   Pupils round, reactive to light  Extraocular motions intact and wnl  Tympanic membranes normal    Neck: no nuchal rigidity  No masses " palpable.  No carotid bruits.  No thyromegaly.    Respiratory: Equal breath sounds  No wheezes,    rales,    nor rhonchi  No respiratory distress.    Heart: Regular rate and rhythm, no loud   murmurs  no rubs/gallops    Abdomen: no masses palpable, no rebound nor guarding, no rebound nor guarding.obese     Extremities: NO cyanosis noted, no clubbing.   No edema noted.  1+ dorsalis pedis pulses. warm    Normal-not antalgic, steady gait.  Denies lesions declines exam sees PODs  No visits with results within 3 Month(s) from this visit.   Latest known visit with results is:   Lab on 10/31/2023   Component Date Value Ref Range Status    Protime 10/31/2023 12.0  9.8 - 12.8 seconds Final    INR 10/31/2023 1.1  0.9 - 1.1 Final    aPTT 10/31/2023 29  27 - 38 seconds Final    Glucose 10/31/2023 137 (H)  74 - 99 mg/dL Final    Sodium 10/31/2023 142  136 - 145 mmol/L Final    Potassium 10/31/2023 3.7  3.5 - 5.3 mmol/L Final    Chloride 10/31/2023 103  98 - 107 mmol/L Final    Bicarbonate 10/31/2023 27  21 - 32 mmol/L Final    Anion Gap 10/31/2023 16  10 - 20 mmol/L Final    Urea Nitrogen 10/31/2023 29 (H)  6 - 23 mg/dL Final    Creatinine 10/31/2023 2.35 (H)  0.50 - 1.30 mg/dL Final    eGFR 10/31/2023 27 (L)  >60 mL/min/1.73m*2 Final    Calculations of estimated GFR are performed using the 2021 CKD-EPI Study Refit equation without the race variable for the IDMS-Traceable creatinine methods.  https://jasn.asnjournals.org/content/early/2021/09/22/ASN.1965927423    Calcium 10/31/2023 8.8  8.6 - 10.3 mg/dL Final    Albumin 10/31/2023 3.7  3.4 - 5.0 g/dL Final    Alkaline Phosphatase 10/31/2023 60  33 - 136 U/L Final    Total Protein 10/31/2023 6.8  6.4 - 8.2 g/dL Final    AST 10/31/2023 11  9 - 39 U/L Final    Bilirubin, Total 10/31/2023 0.5  0.0 - 1.2 mg/dL Final    ALT 10/31/2023 8 (L)  10 - 52 U/L Final    Patients treated with Sulfasalazine may generate falsely decreased results for ALT.    WBC 10/31/2023 8.2  4.4 - 11.3  x10*3/uL Final    nRBC 10/31/2023 0.0  0.0 - 0.0 /100 WBCs Final    RBC 10/31/2023 4.55  4.50 - 5.90 x10*6/uL Final    Hemoglobin 10/31/2023 11.3 (L)  13.5 - 17.5 g/dL Final    Hematocrit 10/31/2023 35.5 (L)  41.0 - 52.0 % Final    MCV 10/31/2023 78 (L)  80 - 100 fL Final    MCH 10/31/2023 24.8 (L)  26.0 - 34.0 pg Final    MCHC 10/31/2023 31.8 (L)  32.0 - 36.0 g/dL Final    RDW 10/31/2023 15.9 (H)  11.5 - 14.5 % Final    Platelets 10/31/2023 181  150 - 450 x10*3/uL Final    MPV 10/31/2023 10.8  7.5 - 11.5 fL Final    Neutrophils % 10/31/2023 61.9  40.0 - 80.0 % Final    Immature Granulocytes %, Automated 10/31/2023 0.4  0.0 - 0.9 % Final    Immature Granulocyte Count (IG) includes promyelocytes, myelocytes and metamyelocytes but does not include bands. Percent differential counts (%) should be interpreted in the context of the absolute cell counts (cells/UL).    Lymphocytes % 10/31/2023 21.2  13.0 - 44.0 % Final    Monocytes % 10/31/2023 9.4  2.0 - 10.0 % Final    Eosinophils % 10/31/2023 6.0  0.0 - 6.0 % Final    Basophils % 10/31/2023 1.1  0.0 - 2.0 % Final    Neutrophils Absolute 10/31/2023 5.10  1.60 - 5.50 x10*3/uL Final    Percent differential counts (%) should be interpreted in the context of the absolute cell counts (cells/uL).    Immature Granulocytes Absolute, Au* 10/31/2023 0.03  0.00 - 0.50 x10*3/uL Final    Lymphocytes Absolute 10/31/2023 1.74  0.80 - 3.00 x10*3/uL Final    Monocytes Absolute 10/31/2023 0.77  0.05 - 0.80 x10*3/uL Final    Eosinophils Absolute 10/31/2023 0.49 (H)  0.00 - 0.40 x10*3/uL Final    Basophils Absolute 10/31/2023 0.09  0.00 - 0.10 x10*3/uL Final        Assessment/Plan     Problem List Items Addressed This Visit       Anemia in stage 3b chronic kidney disease (CMS/formerly [facility])    BPH (benign prostatic hyperplasia)    Type 2 diabetes mellitus with diabetic polyneuropathy (CMS/formerly [facility])    Relevant Orders    POCT glycosylated hemoglobin (Hb A1C) manually resulted    Essential hypertension     Hemiparesis of left dominant side as late effect of cerebral infarction (CMS/HCC)    Mixed hyperlipidemia    Chronic kidney disease, stage 4 (severe) (CMS/HCC)    Cerebrovascular accident (CVA) with involvement of left side of body (CMS/HCC)    PVD (peripheral vascular disease) (CMS/[facility])     Other Visit Diagnoses       Encounter for Medicare annual wellness exam            Sees dr herrera q 3-5mo    Advance Care Planning Note   Discussion Date: 02/21/24   Discussion Participants: patient    The patient wishes to discuss Advance Care Planning today and the following is a brief summary of our discussion.     Patient has capacity to make their own medical decisions: Yes  Health Care Agent/Surrogate Decision Maker documented in chart: Yes  Documents on file and valid:  Advance Directive/Living Will: Yes   Health Care Power of : Yes  Communication of Medical Status/Prognosis:   yes   Communication of Treatment Goals/Options:   yes  Treatment Decisions  yes  Time Statement: Total face to face time spent on advance care planning was <30 minutes with <30 minutes spent in counseling, including the explanation.  Hba1c 7.5  Sugars<200  And again had a lengthy discussion w pt  about risks of poorly controlled diabetes including micro and macrovascular complications of DM2 including blindness,MI,CVA and death among other possibilities. Pt aware and agrees to better compliance and adherance to instructions such as regular eye exams q 1-2 y, foot exams,and f/u regularly for hba1c with a goal of 6.5.    NO evidence of neuropathy or nephropathy at this point    Follow up as planned for hba1c and BP checks REGULARLY.  Sees dr sarath de la cruz for freq epistaxis-not today  To er if cannot stop  SEE ME AT NEXT REGULARLY SCHEDULED VISIT-sooner if condition deteriorates or new problems arise.  Full code desired  No depression  Phq2 (-)  No hi/si  Sees vasc surgeon-who dcd plavix-started eliquis-explained rba today also by me

## 2024-03-04 ENCOUNTER — OFFICE VISIT (OUTPATIENT)
Dept: OTOLARYNGOLOGY | Facility: CLINIC | Age: 84
End: 2024-03-04
Payer: MEDICARE

## 2024-03-04 DIAGNOSIS — R04.0 EPISTAXIS: Primary | ICD-10-CM

## 2024-03-04 PROCEDURE — 30903 CONTROL OF NOSEBLEED: CPT | Performed by: OTOLARYNGOLOGY

## 2024-03-04 PROCEDURE — 1160F RVW MEDS BY RX/DR IN RCRD: CPT | Performed by: OTOLARYNGOLOGY

## 2024-03-04 PROCEDURE — 1159F MED LIST DOCD IN RCRD: CPT | Performed by: OTOLARYNGOLOGY

## 2024-03-04 PROCEDURE — 1157F ADVNC CARE PLAN IN RCRD: CPT | Performed by: OTOLARYNGOLOGY

## 2024-03-04 NOTE — PROGRESS NOTES
The patient is seen today for evaluation of nasal bleeding right side.  He has had significant bleeds from that right side.  He has not any significant bleeding from the left side.  He is on Eliquis.  He denies any other worrisome ENT symptoms or signs.  Longstanding history of nasal vestibulitis utilizing Bactroban ointment.    There have been no significant changes in past medical or past surgical histories except as mentioned.    Physical exam:  After topical anesthesia, patient underwent dedicated nasal debridement.  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  The patient had cauterization performed on the right side of the septum at the level of the perforation for definitive control followed by application of fibrillar packing as an absorbable packing which will dissolve over the next several days.  Patient tolerated his complex procedure well.    Assessment and plan:  Status post complex control right epistaxis as described above.  Septal perforation as etiology for this.  Continue with use of the antibiotic ointment as directed.  Recheck with me in several months sooner with issue.  All questions were answered in this regard accordingly.

## 2024-03-05 DIAGNOSIS — N40.0 BENIGN PROSTATIC HYPERPLASIA, UNSPECIFIED WHETHER LOWER URINARY TRACT SYMPTOMS PRESENT: ICD-10-CM

## 2024-03-05 DIAGNOSIS — E78.2 MIXED HYPERLIPIDEMIA: ICD-10-CM

## 2024-03-05 RX ORDER — ATORVASTATIN CALCIUM 40 MG/1
40 TABLET, FILM COATED ORAL DAILY
Qty: 90 TABLET | Refills: 3 | Status: SHIPPED | OUTPATIENT
Start: 2024-03-05

## 2024-03-05 RX ORDER — FINASTERIDE 5 MG/1
5 TABLET, FILM COATED ORAL DAILY
Qty: 90 TABLET | Refills: 3 | Status: SHIPPED | OUTPATIENT
Start: 2024-03-05

## 2024-03-20 ENCOUNTER — LAB (OUTPATIENT)
Dept: LAB | Facility: LAB | Age: 84
End: 2024-03-20
Payer: MEDICARE

## 2024-03-20 DIAGNOSIS — I73.9 PVD (PERIPHERAL VASCULAR DISEASE) (CMS-HCC): ICD-10-CM

## 2024-03-20 DIAGNOSIS — E11.21 TYPE 2 DIABETES MELLITUS WITH DIABETIC NEPHROPATHY (MULTI): ICD-10-CM

## 2024-03-20 DIAGNOSIS — R35.0 BENIGN PROSTATIC HYPERPLASIA WITH URINARY FREQUENCY: ICD-10-CM

## 2024-03-20 DIAGNOSIS — I63.9 CEREBROVASCULAR ACCIDENT (CVA) WITH INVOLVEMENT OF LEFT SIDE OF BODY (MULTI): ICD-10-CM

## 2024-03-20 DIAGNOSIS — I63.9 ISCHEMIC STROKE (MULTI): ICD-10-CM

## 2024-03-20 DIAGNOSIS — N18.4 CHRONIC KIDNEY DISEASE, STAGE 4 (SEVERE) (MULTI): Primary | ICD-10-CM

## 2024-03-20 DIAGNOSIS — N40.1 BENIGN PROSTATIC HYPERPLASIA WITH URINARY FREQUENCY: ICD-10-CM

## 2024-03-20 DIAGNOSIS — Z00.00 ENCOUNTER FOR MEDICARE ANNUAL WELLNESS EXAM: ICD-10-CM

## 2024-03-20 DIAGNOSIS — E11.42 TYPE 2 DIABETES MELLITUS WITH DIABETIC POLYNEUROPATHY, WITHOUT LONG-TERM CURRENT USE OF INSULIN (MULTI): ICD-10-CM

## 2024-03-20 DIAGNOSIS — E78.2 MIXED HYPERLIPIDEMIA: ICD-10-CM

## 2024-03-20 DIAGNOSIS — N18.32 ANEMIA IN STAGE 3B CHRONIC KIDNEY DISEASE (MULTI): ICD-10-CM

## 2024-03-20 DIAGNOSIS — D63.1 ANEMIA IN STAGE 3B CHRONIC KIDNEY DISEASE (MULTI): ICD-10-CM

## 2024-03-20 DIAGNOSIS — I10 ESSENTIAL HYPERTENSION: ICD-10-CM

## 2024-03-20 DIAGNOSIS — E55.9 VITAMIN D DEFICIENCY, UNSPECIFIED: ICD-10-CM

## 2024-03-20 DIAGNOSIS — I69.352 HEMIPARESIS OF LEFT DOMINANT SIDE AS LATE EFFECT OF CEREBRAL INFARCTION (MULTI): ICD-10-CM

## 2024-03-20 DIAGNOSIS — R94.6 ABNORMAL RESULTS OF THYROID FUNCTION STUDIES: ICD-10-CM

## 2024-03-20 LAB
25(OH)D3 SERPL-MCNC: 64 NG/ML (ref 30–100)
ALBUMIN SERPL BCP-MCNC: 3.6 G/DL (ref 3.4–5)
ALP SERPL-CCNC: 63 U/L (ref 33–136)
ALT SERPL W P-5'-P-CCNC: 7 U/L (ref 10–52)
ANION GAP SERPL CALC-SCNC: 13 MMOL/L (ref 10–20)
AST SERPL W P-5'-P-CCNC: 11 U/L (ref 9–39)
BASOPHILS # BLD AUTO: 0.1 X10*3/UL (ref 0–0.1)
BASOPHILS NFR BLD AUTO: 1.1 %
BILIRUB SERPL-MCNC: 0.5 MG/DL (ref 0–1.2)
BUN SERPL-MCNC: 34 MG/DL (ref 6–23)
CALCIUM SERPL-MCNC: 8.5 MG/DL (ref 8.6–10.3)
CHLORIDE SERPL-SCNC: 106 MMOL/L (ref 98–107)
CHOLEST SERPL-MCNC: 102 MG/DL (ref 0–199)
CHOLESTEROL/HDL RATIO: 3.4
CO2 SERPL-SCNC: 26 MMOL/L (ref 21–32)
CREAT SERPL-MCNC: 2.39 MG/DL (ref 0.5–1.3)
CREAT UR-MCNC: 105.8 MG/DL (ref 20–370)
EGFRCR SERPLBLD CKD-EPI 2021: 26 ML/MIN/1.73M*2
EOSINOPHIL # BLD AUTO: 0.58 X10*3/UL (ref 0–0.4)
EOSINOPHIL NFR BLD AUTO: 6.5 %
ERYTHROCYTE [DISTWIDTH] IN BLOOD BY AUTOMATED COUNT: 15.9 % (ref 11.5–14.5)
EST. AVERAGE GLUCOSE BLD GHB EST-MCNC: 160 MG/DL
GLUCOSE SERPL-MCNC: 181 MG/DL (ref 74–99)
HBA1C MFR BLD: 7.2 %
HCT VFR BLD AUTO: 35.4 % (ref 41–52)
HDLC SERPL-MCNC: 30.1 MG/DL
HGB BLD-MCNC: 11.2 G/DL (ref 13.5–17.5)
IMM GRANULOCYTES # BLD AUTO: 0.03 X10*3/UL (ref 0–0.5)
IMM GRANULOCYTES NFR BLD AUTO: 0.3 % (ref 0–0.9)
LDLC SERPL CALC-MCNC: 46 MG/DL
LYMPHOCYTES # BLD AUTO: 2.04 X10*3/UL (ref 0.8–3)
LYMPHOCYTES NFR BLD AUTO: 22.9 %
MCH RBC QN AUTO: 24.5 PG (ref 26–34)
MCHC RBC AUTO-ENTMCNC: 31.6 G/DL (ref 32–36)
MCV RBC AUTO: 77 FL (ref 80–100)
MICROALBUMIN UR-MCNC: 23.1 MG/L
MICROALBUMIN/CREAT UR: 21.8 UG/MG CREAT
MONOCYTES # BLD AUTO: 0.69 X10*3/UL (ref 0.05–0.8)
MONOCYTES NFR BLD AUTO: 7.7 %
NEUTROPHILS # BLD AUTO: 5.48 X10*3/UL (ref 1.6–5.5)
NEUTROPHILS NFR BLD AUTO: 61.5 %
NON HDL CHOLESTEROL: 72 MG/DL (ref 0–149)
NRBC BLD-RTO: 0 /100 WBCS (ref 0–0)
PHOSPHATE SERPL-MCNC: 3.3 MG/DL (ref 2.5–4.9)
PLATELET # BLD AUTO: 196 X10*3/UL (ref 150–450)
POTASSIUM SERPL-SCNC: 3.8 MMOL/L (ref 3.5–5.3)
PROT SERPL-MCNC: 6.3 G/DL (ref 6.4–8.2)
PTH-INTACT SERPL-MCNC: 127 PG/ML (ref 18.5–88)
RBC # BLD AUTO: 4.58 X10*6/UL (ref 4.5–5.9)
SODIUM SERPL-SCNC: 141 MMOL/L (ref 136–145)
T4 FREE SERPL-MCNC: 1.05 NG/DL (ref 0.61–1.12)
TRIGL SERPL-MCNC: 132 MG/DL (ref 0–149)
TSH SERPL-ACNC: 2.85 MIU/L (ref 0.44–3.98)
VLDL: 26 MG/DL (ref 0–40)
WBC # BLD AUTO: 8.9 X10*3/UL (ref 4.4–11.3)

## 2024-03-20 PROCEDURE — 36415 COLL VENOUS BLD VENIPUNCTURE: CPT

## 2024-03-20 PROCEDURE — 84443 ASSAY THYROID STIM HORMONE: CPT

## 2024-03-20 PROCEDURE — 80053 COMPREHEN METABOLIC PANEL: CPT

## 2024-03-20 PROCEDURE — 84100 ASSAY OF PHOSPHORUS: CPT

## 2024-03-20 PROCEDURE — 85025 COMPLETE CBC W/AUTO DIFF WBC: CPT

## 2024-03-20 PROCEDURE — 82306 VITAMIN D 25 HYDROXY: CPT

## 2024-03-20 PROCEDURE — 83036 HEMOGLOBIN GLYCOSYLATED A1C: CPT

## 2024-03-20 PROCEDURE — 84439 ASSAY OF FREE THYROXINE: CPT

## 2024-03-20 PROCEDURE — 80061 LIPID PANEL: CPT

## 2024-03-20 PROCEDURE — 83970 ASSAY OF PARATHORMONE: CPT

## 2024-03-20 PROCEDURE — 82043 UR ALBUMIN QUANTITATIVE: CPT

## 2024-03-20 PROCEDURE — 82570 ASSAY OF URINE CREATININE: CPT

## 2024-05-15 ENCOUNTER — OFFICE VISIT (OUTPATIENT)
Dept: OTOLARYNGOLOGY | Facility: CLINIC | Age: 84
End: 2024-05-15
Payer: MEDICARE

## 2024-05-15 DIAGNOSIS — R04.0 RIGHT-SIDED EPISTAXIS: Primary | ICD-10-CM

## 2024-05-15 DIAGNOSIS — J34.89 NASAL SEPTAL PERFORATION: ICD-10-CM

## 2024-05-15 PROCEDURE — 1160F RVW MEDS BY RX/DR IN RCRD: CPT | Performed by: OTOLARYNGOLOGY

## 2024-05-15 PROCEDURE — 1157F ADVNC CARE PLAN IN RCRD: CPT | Performed by: OTOLARYNGOLOGY

## 2024-05-15 PROCEDURE — 1159F MED LIST DOCD IN RCRD: CPT | Performed by: OTOLARYNGOLOGY

## 2024-05-15 PROCEDURE — 30903 CONTROL OF NOSEBLEED: CPT | Performed by: OTOLARYNGOLOGY

## 2024-05-15 NOTE — PROGRESS NOTES
Patient returns.  Seeing him back today following history of septal perforation with epistaxis due to anticoagulation.  He has had some further bleeding.  We are seeing him to control same.  Most the bleeding is right-sided.  All remaining ENT inquires otherwise clear.  No other change in past medical surgical history.    Physical exam/procedure:  After topical anesthesia, patient underwent nasal debridement right side in particular.  Clear identification of bleeding source superior rim of the perforation identified on the right-hand side and judicially cauterized with silver nitrate.  This did take a fair amount of work to get this under control due to anticoagulation but finally were able to get it under control and he tolerated this relatively well.    Assessment and plan:  Status post complex control epistaxis right side due to anticoagulation with septal perforation as described.  At this juncture he will avoid blowing the nose for the next 4 to 5 days and sneeze with the mouth open.  If he bleeds further I will see him back.  All questions were answered in this regard accordingly.

## 2024-06-11 ENCOUNTER — APPOINTMENT (OUTPATIENT)
Dept: RADIOLOGY | Facility: HOSPITAL | Age: 84
End: 2024-06-11
Payer: MEDICARE

## 2024-06-11 ENCOUNTER — HOSPITAL ENCOUNTER (EMERGENCY)
Facility: HOSPITAL | Age: 84
Discharge: HOME | End: 2024-06-11
Payer: MEDICARE

## 2024-06-11 VITALS
HEART RATE: 67 BPM | TEMPERATURE: 98.8 F | HEIGHT: 68 IN | RESPIRATION RATE: 16 BRPM | WEIGHT: 230 LBS | BODY MASS INDEX: 34.86 KG/M2 | DIASTOLIC BLOOD PRESSURE: 78 MMHG | SYSTOLIC BLOOD PRESSURE: 167 MMHG | OXYGEN SATURATION: 96 %
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
## 2024-06-11 DIAGNOSIS — S83.91XA SPRAIN OF RIGHT KNEE, UNSPECIFIED LIGAMENT, INITIAL ENCOUNTER: ICD-10-CM

## 2024-06-11 LAB
ALBUMIN SERPL BCP-MCNC: 3.5 G/DL (ref 3.4–5)
ALP SERPL-CCNC: 55 U/L (ref 33–136)
ALT SERPL W P-5'-P-CCNC: 6 U/L (ref 10–52)
ANION GAP SERPL CALC-SCNC: 11 MMOL/L (ref 10–20)
APTT PPP: 29 SECONDS (ref 27–38)
AST SERPL W P-5'-P-CCNC: 8 U/L (ref 9–39)
BASOPHILS # BLD AUTO: 0.06 X10*3/UL (ref 0–0.1)
BASOPHILS NFR BLD AUTO: 0.6 %
BILIRUB SERPL-MCNC: 0.7 MG/DL (ref 0–1.2)
BUN SERPL-MCNC: 28 MG/DL (ref 6–23)
CALCIUM SERPL-MCNC: 8.7 MG/DL (ref 8.6–10.3)
CHLORIDE SERPL-SCNC: 106 MMOL/L (ref 98–107)
CO2 SERPL-SCNC: 27 MMOL/L (ref 21–32)
CREAT SERPL-MCNC: 2.51 MG/DL (ref 0.5–1.3)
CRP SERPL-MCNC: 15.23 MG/DL
EGFRCR SERPLBLD CKD-EPI 2021: 25 ML/MIN/1.73M*2
EOSINOPHIL # BLD AUTO: 0.35 X10*3/UL (ref 0–0.4)
EOSINOPHIL NFR BLD AUTO: 3.4 %
ERYTHROCYTE [DISTWIDTH] IN BLOOD BY AUTOMATED COUNT: 15.9 % (ref 11.5–14.5)
ERYTHROCYTE [SEDIMENTATION RATE] IN BLOOD BY WESTERGREN METHOD: 51 MM/H (ref 0–20)
GLUCOSE SERPL-MCNC: 186 MG/DL (ref 74–99)
HCT VFR BLD AUTO: 32.6 % (ref 41–52)
HGB BLD-MCNC: 10.3 G/DL (ref 13.5–17.5)
IMM GRANULOCYTES # BLD AUTO: 0.04 X10*3/UL (ref 0–0.5)
IMM GRANULOCYTES NFR BLD AUTO: 0.4 % (ref 0–0.9)
INR PPP: 1.2 (ref 0.9–1.1)
LACTATE SERPL-SCNC: 1.8 MMOL/L (ref 0.4–2)
LYMPHOCYTES # BLD AUTO: 1.5 X10*3/UL (ref 0.8–3)
LYMPHOCYTES NFR BLD AUTO: 14.4 %
MCH RBC QN AUTO: 24.1 PG (ref 26–34)
MCHC RBC AUTO-ENTMCNC: 31.6 G/DL (ref 32–36)
MCV RBC AUTO: 76 FL (ref 80–100)
MONOCYTES # BLD AUTO: 1.06 X10*3/UL (ref 0.05–0.8)
MONOCYTES NFR BLD AUTO: 10.2 %
NEUTROPHILS # BLD AUTO: 7.4 X10*3/UL (ref 1.6–5.5)
NEUTROPHILS NFR BLD AUTO: 71 %
NRBC BLD-RTO: 0 /100 WBCS (ref 0–0)
PLATELET # BLD AUTO: 170 X10*3/UL (ref 150–450)
POTASSIUM SERPL-SCNC: 3.4 MMOL/L (ref 3.5–5.3)
PROT SERPL-MCNC: 6.5 G/DL (ref 6.4–8.2)
PROTHROMBIN TIME: 13.4 SECONDS (ref 9.8–12.8)
RBC # BLD AUTO: 4.28 X10*6/UL (ref 4.5–5.9)
SODIUM SERPL-SCNC: 141 MMOL/L (ref 136–145)
WBC # BLD AUTO: 10.4 X10*3/UL (ref 4.4–11.3)

## 2024-06-11 PROCEDURE — 73700 CT LOWER EXTREMITY W/O DYE: CPT | Mod: RT

## 2024-06-11 PROCEDURE — 83605 ASSAY OF LACTIC ACID: CPT

## 2024-06-11 PROCEDURE — 99285 EMERGENCY DEPT VISIT HI MDM: CPT | Mod: 25

## 2024-06-11 PROCEDURE — 70450 CT HEAD/BRAIN W/O DYE: CPT

## 2024-06-11 PROCEDURE — 72125 CT NECK SPINE W/O DYE: CPT | Performed by: RADIOLOGY

## 2024-06-11 PROCEDURE — 36415 COLL VENOUS BLD VENIPUNCTURE: CPT

## 2024-06-11 PROCEDURE — 86140 C-REACTIVE PROTEIN: CPT

## 2024-06-11 PROCEDURE — 85652 RBC SED RATE AUTOMATED: CPT

## 2024-06-11 PROCEDURE — 2500000001 HC RX 250 WO HCPCS SELF ADMINISTERED DRUGS (ALT 637 FOR MEDICARE OP)

## 2024-06-11 PROCEDURE — 93971 EXTREMITY STUDY: CPT

## 2024-06-11 PROCEDURE — 85610 PROTHROMBIN TIME: CPT

## 2024-06-11 PROCEDURE — 70450 CT HEAD/BRAIN W/O DYE: CPT | Performed by: RADIOLOGY

## 2024-06-11 PROCEDURE — 72125 CT NECK SPINE W/O DYE: CPT

## 2024-06-11 PROCEDURE — 93971 EXTREMITY STUDY: CPT | Mod: FOREIGN READ | Performed by: RADIOLOGY

## 2024-06-11 PROCEDURE — 80053 COMPREHEN METABOLIC PANEL: CPT

## 2024-06-11 PROCEDURE — 73700 CT LOWER EXTREMITY W/O DYE: CPT | Mod: RIGHT SIDE | Performed by: RADIOLOGY

## 2024-06-11 PROCEDURE — 85025 COMPLETE CBC W/AUTO DIFF WBC: CPT

## 2024-06-11 RX ORDER — POTASSIUM CHLORIDE 20 MEQ/1
20 TABLET, EXTENDED RELEASE ORAL ONCE
Status: DISCONTINUED | OUTPATIENT
Start: 2024-06-11 | End: 2024-06-11

## 2024-06-11 RX ORDER — POTASSIUM CHLORIDE 1.5 G/1.58G
20 POWDER, FOR SOLUTION ORAL ONCE
Status: COMPLETED | OUTPATIENT
Start: 2024-06-11 | End: 2024-06-11

## 2024-06-11 RX ORDER — HYDROCODONE BITARTRATE AND ACETAMINOPHEN 5; 325 MG/1; MG/1
1 TABLET ORAL EVERY 6 HOURS PRN
Qty: 8 TABLET | Refills: 0 | Status: SHIPPED | OUTPATIENT
Start: 2024-06-11 | End: 2024-06-13

## 2024-06-11 RX ORDER — HYDROCODONE BITARTRATE AND ACETAMINOPHEN 5; 325 MG/1; MG/1
1 TABLET ORAL ONCE
Status: COMPLETED | OUTPATIENT
Start: 2024-06-11 | End: 2024-06-11

## 2024-06-11 RX ORDER — DICLOFENAC SODIUM 10 MG/G
4 GEL TOPICAL 4 TIMES DAILY PRN
Qty: 20 G | Refills: 0 | Status: SHIPPED | OUTPATIENT
Start: 2024-06-11

## 2024-06-11 RX ORDER — POTASSIUM CHLORIDE 1.5 G/1.58G
20 POWDER, FOR SOLUTION ORAL 2 TIMES DAILY
Status: DISCONTINUED | OUTPATIENT
Start: 2024-06-11 | End: 2024-06-11

## 2024-06-11 ASSESSMENT — PAIN - FUNCTIONAL ASSESSMENT
PAIN_FUNCTIONAL_ASSESSMENT: 0-10

## 2024-06-11 ASSESSMENT — PAIN SCALES - GENERAL
PAINLEVEL_OUTOF10: 0 - NO PAIN
PAINLEVEL_OUTOF10: 8
PAINLEVEL_OUTOF10: 8
PAINLEVEL_OUTOF10: 4

## 2024-06-11 ASSESSMENT — PAIN DESCRIPTION - LOCATION
LOCATION: KNEE
LOCATION: LEG

## 2024-06-11 ASSESSMENT — LIFESTYLE VARIABLES
HAVE PEOPLE ANNOYED YOU BY CRITICIZING YOUR DRINKING: NO
EVER HAD A DRINK FIRST THING IN THE MORNING TO STEADY YOUR NERVES TO GET RID OF A HANGOVER: NO
EVER FELT BAD OR GUILTY ABOUT YOUR DRINKING: NO
HAVE YOU EVER FELT YOU SHOULD CUT DOWN ON YOUR DRINKING: NO
TOTAL SCORE: 0

## 2024-06-11 ASSESSMENT — PAIN DESCRIPTION - ORIENTATION
ORIENTATION: RIGHT
ORIENTATION: RIGHT;MID
ORIENTATION: RIGHT

## 2024-06-11 ASSESSMENT — PAIN DESCRIPTION - PAIN TYPE
TYPE: ACUTE PAIN
TYPE: ACUTE PAIN

## 2024-06-11 ASSESSMENT — COLUMBIA-SUICIDE SEVERITY RATING SCALE - C-SSRS
6. HAVE YOU EVER DONE ANYTHING, STARTED TO DO ANYTHING, OR PREPARED TO DO ANYTHING TO END YOUR LIFE?: NO
2. HAVE YOU ACTUALLY HAD ANY THOUGHTS OF KILLING YOURSELF?: NO

## 2024-06-11 ASSESSMENT — PAIN DESCRIPTION - DESCRIPTORS: DESCRIPTORS: ACHING

## 2024-06-11 ASSESSMENT — PAIN DESCRIPTION - FREQUENCY: FREQUENCY: CONSTANT/CONTINUOUS

## 2024-06-11 NOTE — ED PROVIDER NOTES
HPI   Chief Complaint   Patient presents with    Fall     Fell on Friday R leg denies hitting head not on thinners, pt has stents        History provided by: Patient and daughter    Limitations to history: None    CC: Knee injury    HPI: 84-year-old male with a history of stroke with mild left-sided residual weakness, peripheral arterial disease, hypertension, hyperlipidemia presents to the emergency department to be evaluated for knee injury.  Patient states that he originally had his fall 3 days ago.  He states he was walking out of the house when he tripped and fell backwards.  Denies head is out of lose consciousness.  Is not on anticoagulants or antiplatelets.  He denies headache, vision changes, neck pain, nausea or vomiting, lightheadedness or dizziness.  Denies history of frequent falls or syncopal episodes.  He does have a history of PAD where he has had stents in his bilateral lower extremities.  He was on Eliquis due to this but they discontinue the Eliquis due to epistaxis.  Daughter is with him and denies behavioral mental status changes.  Patient states that he was originally not complaining of any injury after the fall but daughter states that over the last few days has been complaining of worsening pain in his right knee, daughter noticed some swelling so she brought him to the ER.  He is able to bear weight but is having a harder time bearing weight because of this pain.  He does live with his daughter and he is still able to perform his ADLs.  Daughter does not believe she is having a harder time taking care of him because of this but wanted him evaluated.  Denies numbness tingling or weakness in the extremity.  Denies pain or injury elsewhere.  Denies chest pain or shortness of breath, denies history of heart or lung disease.  Taking Tylenol and it was not helping much with the discomfort.    ROS: Negative unless mentioned in HPI    Social Hx: Former smoker.  Denies alcohol and drug  use.    Medical Hx: Allergy to iodine and shellfish    Physical exam:    Constitutional: Patient is well-nourished and well-developed.  Sitting comfortably in the room and in no distress.  Oriented to person, place, time, and situation.    HEENT: Head is normocephalic, atraumatic. Patient's airway is patent.  Tympanic membranes are clear bilaterally.  Nasal mucosa clear.  Mouth with normal mucosa.  Throat is not erythematous and there are no oropharyngeal exudates, uvula is midline.  No obvious facial deformities.    Eyes: Clear bilaterally.  Pupils are equal round and reactive to light and accommodation.  Extraocular movements intact.      Cardiac: Regular rate, regular rhythm.  Heart sounds S1, S2.  No murmurs, rubs, or gallops.  PMI nondisplaced.  No JVD.    Respiratory: Regular respiratory rate and effort.  Breath sounds are clear and equal bilaterally, no adventitious lung sounds.  Patient is speaking in full sentences and is in no apparent respiratory distress. No use of accessory muscles.  No Landon sign or raccoon eyes.  Is full range of motion in his neck and no midline cervical spine tenderness.    Gastrointestinal: Abdomen is soft, nondistended, and nontender.  There are no obvious deformities.  No rebound tenderness or guarding.  Bowel sounds are normal active.    Genitourinary: No CVA or flank tenderness.    Musculoskeletal: Patient has mild diffuse tenderness of the right knee.  There is an effusion and soft tissue swelling.  Patient has a very small circular area of redness over the lateral aspect of the knee but the knee generally itself is not warm or hot to the touch.  He does have good ability to flex and extend the knee however it does cause discomfort.  No calf tenderness.  Negative Homans' sign.  No obvious skin or bony deformities.  Patient has 4/5 strength in his left upper and left lower extremity compared to his right side however this is baseline due to stroke.  No back or neck tenderness.   Capillary refill less than 3 seconds.  Strong peripheral pulses.  No sensory deficits.    Neurological: Patient is alert and oriented.  No focal deficits Cranial nerves II through XII intact. GCS15.     Skin: Skin is normal color for race and is warm, dry, and intact.      Psych: Appropriate mood and affect.  No apparent risk to self or others.    Heme/lymph: No adenopathy, lymphadenopathy, or splenomegaly    Physical exam is otherwise negative unless stated above or in history of present illness.                          No data recorded                   Patient History   Past Medical History:   Diagnosis Date    Colon cancer screening declined     History of CVA (cerebrovascular accident) 2019     Past Surgical History:   Procedure Laterality Date    COLECTOMY  2006    COLONOSCOPY  2006    after colectomy    GASTROSTOMY TUBE PLACEMENT      2019-2019    MR HEAD ANGIO WO IV CONTRAST  2022    MR HEAD ANGIO WO IV CONTRAST 2022 [facility] CLINICAL LEGACY    MR NECK ANGIO WO IV CONTRAST  2022    MR NECK ANGIO WO IV CONTRAST 2022 [facility] CLINICAL LEGACY    VASCULAR SURGERY      Stent placement B/L Lower Extremetry - 2023, 2023,     Family History   Problem Relation Name Age of Onset    Hypertension Mother      Stroke Father       Social History     Tobacco Use    Smoking status: Former     Current packs/day: 0.00     Types: Cigarettes     Quit date:      Years since quittin.4    Smokeless tobacco: Not on file   Substance Use Topics    Alcohol use: Not Currently    Drug use: Never       Physical Exam   ED Triage Vitals [24 1200]   Temperature Heart Rate Respirations BP   37.1 °C (98.8 °F) 82 18 179/79      Pulse Ox Temp Source Heart Rate Source Patient Position   95 % Temporal Monitor Sitting      BP Location FiO2 (%)     Right arm --       Physical Exam    ED Course & MDM   Diagnoses as of 24 1523   Fall, initial encounter   Sprain of right knee, unspecified  ligament, initial encounter     Patient updated on plan for lab testing, IV insertion, radiology imaging, and medications to be administered while in the ER (if indicated). Patient updated on expected wait times for testing and results. Patient provided my name and told to ask any staff member for questions or concerns if they should arise. Electronic medical record reviewed.     MDM    Upon initial assessment, patient was healthy non-toxic appearing and in no apparent distress.     Patient presented to the emergency department with the chief complaint fall and leg injury. Patient has mild diffuse tenderness of the right knee.  There is an effusion and soft tissue swelling.  Patient has a very small circular area of redness over the lateral aspect of the knee but the knee generally itself is not warm or hot to the touch.  He does have good ability to flex and extend the knee however it does cause discomfort.  No calf tenderness.  Negative Homans' sign.  No obvious skin or bony deformities.  Patient has 4/5 strength in his left upper and left lower extremity compared to his right side however this is baseline due to stroke.  No back or neck tenderness.  Capillary refill less than 3 seconds.  Strong peripheral pulses.  No sensory deficits.  Examination of his heart and lungs unremarkable.  No neurological deficits or other signs of trauma.  On arrival to the emergency department, vital signs were within normal limits    Will give the patient a dose of oral Norco for his discomfort.  Will get basic blood work, inflammatory markers, an ultrasound to rule out a DVT and a CT to rule out an occult fracture given that I would get a CT anyway if the patient's x-ray were negative.  Will get a CT of the head and neck as well given the patient's age after his fall.    Patient is feeling well after the medication.  Updated him on his results.  His CRP is 15 and ESR is 51, likely from the inflammation from his fall and knee  sprain.  CMP reveals that his CKD is similar to baseline.  Potassium is 3.4 so is given oral potassium.  His CBC shows a microcytic anemia that slightly more progressed than usual however not dramatically changed.  CT of the head and neck revealed no traumatic injury and ultrasound reveals no DVT.  CT reveals degenerative changes and effusion but no occult fracture.  Patient feels well and would like to be discharged.  I do believe he likely experienced a sprain of his knee that caused worsening arthritis.  He will follow-up with orthopedics especially continues to have the effusion.  I did discuss this my attending who agrees that the patient shows no findings of suggest a septic or infected joint or bursitis and the patient does not require oral antibiotics at this time.  Patient be given an Ace wrap and I discussed RICE treatment.  He will be given topical Voltaren gel and a few doses of Norco for breakthrough discomfort only.  I recommended that the patient only get this pain at night before he goes to bed if the Tylenol is not helping, reluctant to give him additional or more frequent pain medication given his age and risk for falling or grogginess.  Daughter agrees that she will primarily give him Tylenol and only use the Norco for breakthrough pain.  Follow-up with the PCP as well to discuss the anemia.  All questions and concerns addressed.  Reasons to return to ER discussed.  Patient and daughter verbalized understanding and agreement with the treatment plan and they remained hemodynamically stable in the ER.    This note was dictated using a speech recognition program.  While an attempt was made at proof-reading to minimize errors, minor errors in transcription may be present    Medical Decision Making      Procedure  Procedures     Rodri Pelletier PA-C  06/11/24 3101

## 2024-06-13 NOTE — ED NOTES
"Spoke to the patients daughter  regarding their ED visit, and the patient stated that \"everything is all good\".      Haydee Babb, WEST  06/13/24 [id number]    "

## 2024-08-01 DIAGNOSIS — E11.69 TYPE 2 DIABETES MELLITUS WITH OTHER SPECIFIED COMPLICATION, WITHOUT LONG-TERM CURRENT USE OF INSULIN (MULTI): ICD-10-CM

## 2024-08-02 RX ORDER — SITAGLIPTIN 50 MG/1
50 TABLET, FILM COATED ORAL DAILY
Qty: 90 TABLET | Refills: 3 | Status: SHIPPED | OUTPATIENT
Start: 2024-08-02

## 2024-08-21 ENCOUNTER — APPOINTMENT (OUTPATIENT)
Dept: PRIMARY CARE | Facility: CLINIC | Age: 84
End: 2024-08-21
Payer: MEDICARE

## 2024-08-21 ENCOUNTER — LAB (OUTPATIENT)
Dept: LAB | Facility: LAB | Age: 84
End: 2024-08-21
Payer: MEDICARE

## 2024-08-21 VITALS
BODY MASS INDEX: 35.77 KG/M2 | DIASTOLIC BLOOD PRESSURE: 64 MMHG | TEMPERATURE: 97 F | HEIGHT: 68 IN | OXYGEN SATURATION: 95 % | SYSTOLIC BLOOD PRESSURE: 122 MMHG | WEIGHT: 236 LBS | HEART RATE: 70 BPM | RESPIRATION RATE: 18 BRPM

## 2024-08-21 DIAGNOSIS — D64.9 MILD ANEMIA: ICD-10-CM

## 2024-08-21 DIAGNOSIS — I47.10 PAROXYSMAL SUPRAVENTRICULAR TACHYCARDIA (CMS-HCC): ICD-10-CM

## 2024-08-21 DIAGNOSIS — N18.4: ICD-10-CM

## 2024-08-21 DIAGNOSIS — E11.42 TYPE 2 DIABETES MELLITUS WITH DIABETIC POLYNEUROPATHY, WITHOUT LONG-TERM CURRENT USE OF INSULIN (MULTI): ICD-10-CM

## 2024-08-21 DIAGNOSIS — E78.2 MIXED HYPERLIPIDEMIA: ICD-10-CM

## 2024-08-21 DIAGNOSIS — I10 ESSENTIAL HYPERTENSION: Primary | ICD-10-CM

## 2024-08-21 DIAGNOSIS — N40.1 BENIGN PROSTATIC HYPERPLASIA WITH URINARY FREQUENCY: ICD-10-CM

## 2024-08-21 DIAGNOSIS — E66.01 SEVERE OBESITY WITH BODY MASS INDEX (BMI) OF 35.0 TO 39.9 WITH SERIOUS COMORBIDITY (MULTI): ICD-10-CM

## 2024-08-21 DIAGNOSIS — D22.9 ATYPICAL MOLE: ICD-10-CM

## 2024-08-21 DIAGNOSIS — R35.0 BENIGN PROSTATIC HYPERPLASIA WITH URINARY FREQUENCY: ICD-10-CM

## 2024-08-21 LAB
ALBUMIN SERPL BCP-MCNC: 3.5 G/DL (ref 3.4–5)
ALP SERPL-CCNC: 70 U/L (ref 33–136)
ALT SERPL W P-5'-P-CCNC: 7 U/L (ref 10–52)
ANION GAP SERPL CALC-SCNC: 14 MMOL/L (ref 10–20)
AST SERPL W P-5'-P-CCNC: 10 U/L (ref 9–39)
BASOPHILS # BLD AUTO: 0.08 X10*3/UL (ref 0–0.1)
BASOPHILS NFR BLD AUTO: 0.9 %
BILIRUB SERPL-MCNC: 0.5 MG/DL (ref 0–1.2)
BUN SERPL-MCNC: 33 MG/DL (ref 6–23)
CALCIUM SERPL-MCNC: 8.3 MG/DL (ref 8.6–10.3)
CHLORIDE SERPL-SCNC: 107 MMOL/L (ref 98–107)
CO2 SERPL-SCNC: 26 MMOL/L (ref 21–32)
CREAT SERPL-MCNC: 2.32 MG/DL (ref 0.5–1.3)
EGFRCR SERPLBLD CKD-EPI 2021: 27 ML/MIN/1.73M*2
EOSINOPHIL # BLD AUTO: 0.55 X10*3/UL (ref 0–0.4)
EOSINOPHIL NFR BLD AUTO: 6.2 %
ERYTHROCYTE [DISTWIDTH] IN BLOOD BY AUTOMATED COUNT: 16.5 % (ref 11.5–14.5)
FERRITIN SERPL-MCNC: 31 NG/ML (ref 20–300)
GLUCOSE SERPL-MCNC: 166 MG/DL (ref 74–99)
HCT VFR BLD AUTO: 34.7 % (ref 41–52)
HGB BLD-MCNC: 11 G/DL (ref 13.5–17.5)
IMM GRANULOCYTES # BLD AUTO: 0.03 X10*3/UL (ref 0–0.5)
IMM GRANULOCYTES NFR BLD AUTO: 0.3 % (ref 0–0.9)
LYMPHOCYTES # BLD AUTO: 1.88 X10*3/UL (ref 0.8–3)
LYMPHOCYTES NFR BLD AUTO: 21.3 %
MCH RBC QN AUTO: 24.1 PG (ref 26–34)
MCHC RBC AUTO-ENTMCNC: 31.7 G/DL (ref 32–36)
MCV RBC AUTO: 76 FL (ref 80–100)
MONOCYTES # BLD AUTO: 0.74 X10*3/UL (ref 0.05–0.8)
MONOCYTES NFR BLD AUTO: 8.4 %
NEUTROPHILS # BLD AUTO: 5.55 X10*3/UL (ref 1.6–5.5)
NEUTROPHILS NFR BLD AUTO: 62.9 %
NRBC BLD-RTO: 0 /100 WBCS (ref 0–0)
PLATELET # BLD AUTO: 209 X10*3/UL (ref 150–450)
POTASSIUM SERPL-SCNC: 3.6 MMOL/L (ref 3.5–5.3)
PROT SERPL-MCNC: 6.5 G/DL (ref 6.4–8.2)
RBC # BLD AUTO: 4.57 X10*6/UL (ref 4.5–5.9)
SODIUM SERPL-SCNC: 143 MMOL/L (ref 136–145)
VIT B12 SERPL-MCNC: 289 PG/ML (ref 211–911)
WBC # BLD AUTO: 8.8 X10*3/UL (ref 4.4–11.3)

## 2024-08-21 PROCEDURE — 11300 SHAVE SKIN LESION 0.5 CM/<: CPT | Performed by: FAMILY MEDICINE

## 2024-08-21 PROCEDURE — 1123F ACP DISCUSS/DSCN MKR DOCD: CPT | Performed by: FAMILY MEDICINE

## 2024-08-21 PROCEDURE — 36415 COLL VENOUS BLD VENIPUNCTURE: CPT

## 2024-08-21 PROCEDURE — 3074F SYST BP LT 130 MM HG: CPT | Performed by: FAMILY MEDICINE

## 2024-08-21 PROCEDURE — 1157F ADVNC CARE PLAN IN RCRD: CPT | Performed by: FAMILY MEDICINE

## 2024-08-21 PROCEDURE — 85025 COMPLETE CBC W/AUTO DIFF WBC: CPT

## 2024-08-21 PROCEDURE — 80053 COMPREHEN METABOLIC PANEL: CPT

## 2024-08-21 PROCEDURE — 82607 VITAMIN B-12: CPT

## 2024-08-21 PROCEDURE — 99214 OFFICE O/P EST MOD 30 MIN: CPT | Performed by: FAMILY MEDICINE

## 2024-08-21 PROCEDURE — 3078F DIAST BP <80 MM HG: CPT | Performed by: FAMILY MEDICINE

## 2024-08-21 PROCEDURE — 83036 HEMOGLOBIN GLYCOSYLATED A1C: CPT

## 2024-08-21 PROCEDURE — 1159F MED LIST DOCD IN RCRD: CPT | Performed by: FAMILY MEDICINE

## 2024-08-21 PROCEDURE — 82728 ASSAY OF FERRITIN: CPT

## 2024-08-21 NOTE — PROGRESS NOTES
CHECK UP    Declines vaccines   Juarez Chávez is here for a diabetic follow up    HBA1C= 7.2 & overdue      LDL=46  ELIQULISSET paused for nosebleeds-he is aware of risks  Sees vascular surgery  They had forgotten follow up appt  Sees ENT dr ROSE    Anemia COULD be ACD but offered gi workup    Sugars most recently have been running-   HIGH & LOW <150   Activity level-     advised to increase where possible  The patient denies history of       seizures,             heart attack or KNOWN CAD     + stroke.     No chest pain, shortness of breath, paroxysmal nocturnal dyspnea.     No nausea, vomiting, diarrhea, hematochezia or melena.      No paresthesias or headaches      No dysuria, frequency or hematuria.    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 (Multi)    Chronic kidney disease, stage 4 (severe) (Multi)    Stricture of artery (CMS-formerly [facility])    Cerebrovascular accident (CVA) with involvement of left side of body (Multi)    Class 2 obesity with body mass index (BMI) of 37.0 to 37.9 in adult    Class 2 obesity with body mass index (BMI) of 38.0 to 38.9 in adult    Class 2 severe obesity with serious comorbidity and body mass index (BMI) of 38.0 to 38.9 in adult (Multi)    Dysuria    Former smoker    Palpitations    Seborrheic dermatitis    Severe obesity with body mass index (BMI) of 35.0 to 39.9 with serious comorbidity (Multi)    Elevated PSA    PVD (peripheral vascular disease) (CMS-formerly [facility])    Paroxysmal supraventricular  tachycardia (CMS-HCC)    Right-sided epistaxis [R04.0]    Nasal septal perforation     Past Surgical History:   Procedure Laterality Date    COLECTOMY  2006    COLONOSCOPY  2006    after colectomy    GASTROSTOMY TUBE PLACEMENT      2019-2019    MR HEAD ANGIO WO IV CONTRAST  2022    MR HEAD ANGIO WO IV CONTRAST 2022 [facility]    MR NECK ANGIO WO IV CONTRAST  2022    MR NECK ANGIO WO IV CONTRAST 2022 [facility] CL[facility]ICAL [facility]GAC[facility]    VASCULAR SURGERY      Stent placement B/L Lower Extremetry - 2023, 2023,     Social History     Socioeconomic History    Marital status:    Tobacco Use    Smoking status: Former     Current packs/day: 0.00     Types: Cigarettes     Quit date:      Years since quittin.6   Substance and Sexual Activity    Alcohol use: Not Currently    Drug use: Never     Admission on 2024, Discharged on 2024   Component Date Value Ref Range Status    WBC 2024 10.4  4.4 - 11.3 x10*3/uL Final    nRBC 2024 0.0  0.0 - 0.0 /100 WBCs Final    RBC 2024 4.28 (L)  4.50 - 5.90 x10*6/uL Final    Hemoglobin 2024 10.3 (L)  13.5 - 17.5 g/dL Final    Hematocrit 2024 32.6 (L)  41.0 - 52.0 % Final    MCV 2024 76 (L)  80 - 100 fL Final    MCH 2024 24.1 (L)  26.0 - 34.0 pg Final    MCHC 2024 31.6 (L)  32.0 - 36.0 g/dL Final    RDW 2024 15.9 (H)  11.5 - 14.5 % Final    Platelets 2024 170  150 - 450 x10*3/uL Final    Neutrophils % 2024 71.0  40.0 - 80.0 % Final    Immature Granulocytes %, Automated 2024 0.4  0.0 - 0.9 % Final    Immature Granulocyte Count (IG) includes promyelocytes, myelocytes and metamyelocytes but does not include bands. Percent differential counts (%) should be interpreted in the context of the absolute cell counts (cells/UL).    Lymphocytes % 2024 14.4  13.0 - 44.0 % Final    Monocytes % 2024 10.2  2.0 - 10.0 % Final    Eosinophils % 2024 3.4   0.0 - 6.0 % Final    Basophils % 06/11/2024 0.6  0.0 - 2.0 % Final    Neutrophils Absolute 06/11/2024 7.40 (H)  1.60 - 5.50 x10*3/uL Final    Percent differential counts (%) should be interpreted in the context of the absolute cell counts (cells/uL).    Immature Granulocytes Absolute, Au* 06/11/2024 0.04  0.00 - 0.50 x10*3/uL Final    Lymphocytes Absolute 06/11/2024 1.50  0.80 - 3.00 x10*3/uL Final    Monocytes Absolute 06/11/2024 1.06 (H)  0.05 - 0.80 x10*3/uL Final    Eosinophils Absolute 06/11/2024 0.35  0.00 - 0.40 x10*3/uL Final    Basophils Absolute 06/11/2024 0.06  0.00 - 0.10 x10*3/uL Final    Glucose 06/11/2024 186 (H)  74 - 99 mg/dL Final    Sodium 06/11/2024 141  136 - 145 mmol/L Final    Potassium 06/11/2024 3.4 (L)  3.5 - 5.3 mmol/L Final    Chloride 06/11/2024 106  98 - 107 mmol/L Final    Bicarbonate 06/11/2024 27  21 - 32 mmol/L Final    Anion Gap 06/11/2024 11  10 - 20 mmol/L Final    Urea Nitrogen 06/11/2024 28 (H)  6 - 23 mg/dL Final    Creatinine 06/11/2024 2.51 (H)  0.50 - 1.30 mg/dL Final    eGFR 06/11/2024 25 (L)  >60 mL/min/1.73m*2 Final    Calculations of estimated GFR are performed using the 2021 CKD-EPI Study Refit equation without the race variable for the IDMS-Traceable creatinine methods.  https://jasn.asnjournals.org/content/early/2021/09/22/ASN.7562855237    Calcium 06/11/2024 8.7  8.6 - 10.3 mg/dL Final    Albumin 06/11/2024 3.5  3.4 - 5.0 g/dL Final    Alkaline Phosphatase 06/11/2024 55  33 - 136 U/L Final    Total Protein 06/11/2024 6.5  6.4 - 8.2 g/dL Final    AST 06/11/2024 8 (L)  9 - 39 U/L Final    Bilirubin, Total 06/11/2024 0.7  0.0 - 1.2 mg/dL Final    ALT 06/11/2024 6 (L)  10 - 52 U/L Final    Patients treated with Sulfasalazine may generate falsely decreased results for ALT.    C-Reactive Protein 06/11/2024 15.23 (H)  <1.00 mg/dL Final    Sedimentation Rate 06/11/2024 51 (H)  0 - 20 mm/h Final    Lactate 06/11/2024 1.8  0.4 - 2.0 mmol/L Final    Protime 06/11/2024 13.4 (H)   9.8 - 12.8 seconds Final    INR 06/11/2024 1.2 (H)  0.9 - 1.1 Final    aPTT 06/11/2024 29  27 - 38 seconds Final        Health Maintenance   Topic Date Due    Pneumococcal Vaccine: 65+ Years (1 of 2 - PCV) Never done    Diabetes: Retinopathy Screening  Never done    DTaP/Tdap/Td Vaccines (1 - Tdap) Never done    Zoster Vaccines (1 of 2) Never done    RSV Pregnant patients and/or  patients aged 60+ years (1 - 1-dose 60+ series) Never done    COVID-19 Vaccine (1 - 2023-24 season) Never done    Diabetes: Hemoglobin A1C  06/20/2024    Influenza Vaccine (1) 09/01/2024    Medicare Annual Wellness Visit (AWV)  02/22/2025    Lipid Panel  03/20/2025    HIB Vaccines  Aged Out    Hepatitis B Vaccines  Aged Out    IPV Vaccines  Aged Out    Hepatitis A Vaccines  Aged Out    Meningococcal Vaccine  Aged Out    Rotavirus Vaccines  Aged Out    HPV Vaccines  Aged Out         Wt Readings from Last 3 Encounters:   08/21/24 107 kg (236 lb)   06/11/24 104 kg (230 lb)   12/20/23 104 kg (229 lb)       Temp Readings from Last 3 Encounters:   08/21/24 36.1 °C (97 °F) (Temporal)   06/11/24 37.1 °C (98.8 °F) (Temporal)   02/21/24 36.4 °C (97.5 °F) (Temporal)     BP Readings from Last 3 Encounters:   08/21/24 122/64   06/11/24 167/78   02/21/24 106/52     Pulse Readings from Last 3 Encounters:   08/21/24 70   06/11/24 67   02/21/24 68     PHYSICAL EXAMINATION:      -----------------------------------------------------------------------------------------------------  GENERAL:  The patient appears nourished     &  normal affect.      No acute distress.     Alert and oriented times 3.     No obvious skin rashes or lesions.    No gait disturbance.      HEENT:   Normocephalic, atraumatic.    Normal speech    Extraocular eye motions intact and pain free.                 Pupils reactive and equally round. Conjunctivae clear    TMs normal    No erythema pharynx      NECK:  No masses or adenopathy palpable.  No asymmetry visible.     No  thyromegaly.    No bruits.       RESPIRATORY:  Equal breath sounds / no acute respiratory distress.      No wheezes,rales, or rhonchi        HEART:  Regular rhythm without murmur, rub or gallop.       ABDOMEN:  Soft, nontender.   No masses, guarding or rebound.     Normoactive bowel sounds. ovwt      EXTREMITIES:   No edema in any extremity.           No cyanosis or clubbing.      2+ dorsalis pedis pulses bilaterally    LUE LLE weakness-since cva      FOOT EXAM:    A comprehensive foot exam was performed including monofilament testing or equivalent for sensation.    Normal foot, ankle, and digit range of motion and strength.  Patient was found to have intact sensation, 2+ dorsalis pedis pulses and no concerning calluses, rashes, sores or foot lesions of any kind.   No obvious neuropathy.    1. Essential hypertension        2. Paroxysmal supraventricular tachycardia (CMS-HCC)        3. Mixed hyperlipidemia        4. Type 2 diabetes mellitus with diabetic polyneuropathy, without long-term current use of insulin (Multi)  Comprehensive Metabolic Panel    Hemoglobin A1C      5. Benign prostatic hyperplasia with urinary frequency        6. Severe obesity with body mass index (BMI) of 35.0 to 39.9 with serious comorbidity (Multi)        7. Chronic kidney insufficiency, stage 4 (severe) (Multi)        8. Mild anemia  CBC and Auto Differential      LABS TODAY  The patient is aware that results will be forthcoming of ALL planned labs and or tests. If no results are received on my chart or by letter within 1 - 3 weeks, the patient is aware they need to call to obtain results, as this is not usual. Also, if any new conditions arise, or current condition worsens, it is understood that sooner appointment should be made or urgent care/convenient care or emergency room treatment should be sought depending on severity. Otherwise follow up for recheck at regular intervals as we have discussed, at least yearly.  SEES NEPHRO -  SHERYL  He declines gi workup at this time    And again had a lengthy discussion w pt  about risks of poorly controlled diabetes including micro and macrovascular complications of DM2 including blindness,MI,CVA and death among other possibilities. Pt aware and agrees to better compliance and adherance to instructions such as regular eye exams q 1-2 y, foot exams,and f/u regularly for hba1c with a goal of 6.5.    NO evidence of neuropathy or nephropathy at this point    Follow up as planned for hba1c and BP checks REGULARLY.    Sooner if condition deteriorates or problems arise  Of note irreg mole L back and r shoulder  Dark-suspicious  Consented  For removal  Dtr present  2% w epi lido inj 1cc-removed after cleansed  Tolerated well  Wound care addressed  Patient was informed of wound care and signs and symptoms of infection necessitating a phone call/ office visit or emergency room visit. Expected course of healing and common reactions also discussed. If specimen sent for pathology results should be able to be seen on my chart in 1-2 weeks and or letter sent. If nothing received by 3 weeks time-patient is aware they need to contact office for results as this is not usual.     Jaimee Monte MD

## 2024-08-22 LAB
EST. AVERAGE GLUCOSE BLD GHB EST-MCNC: 163 MG/DL
HBA1C MFR BLD: 7.3 %

## 2024-08-28 LAB
LAB AP ASR DISCLAIMER: NORMAL
LABORATORY COMMENT REPORT: NORMAL
PATH REPORT.FINAL DX SPEC: NORMAL
PATH REPORT.GROSS SPEC: NORMAL
PATH REPORT.MICROSCOPIC SPEC OTHER STN: NORMAL
PATH REPORT.RELEVANT HX SPEC: NORMAL
PATH REPORT.TOTAL CANCER: NORMAL

## 2024-09-24 ENCOUNTER — LAB (OUTPATIENT)
Dept: LAB | Facility: LAB | Age: 84
End: 2024-09-24
Payer: MEDICARE

## 2024-09-24 DIAGNOSIS — E55.9 VITAMIN D DEFICIENCY, UNSPECIFIED: ICD-10-CM

## 2024-09-24 DIAGNOSIS — E11.21 TYPE 2 DIABETES MELLITUS WITH DIABETIC NEPHROPATHY: ICD-10-CM

## 2024-09-24 DIAGNOSIS — N18.4 CHRONIC KIDNEY DISEASE, STAGE 4 (SEVERE) (MULTI): Primary | ICD-10-CM

## 2024-09-24 LAB
25(OH)D3 SERPL-MCNC: 56 NG/ML (ref 30–100)
ALBUMIN SERPL BCP-MCNC: 3.6 G/DL (ref 3.4–5)
ANION GAP SERPL CALC-SCNC: 12 MMOL/L (ref 10–20)
BUN SERPL-MCNC: 31 MG/DL (ref 6–23)
CALCIUM SERPL-MCNC: 8.3 MG/DL (ref 8.6–10.3)
CHLORIDE SERPL-SCNC: 107 MMOL/L (ref 98–107)
CO2 SERPL-SCNC: 26 MMOL/L (ref 21–32)
CREAT SERPL-MCNC: 2.41 MG/DL (ref 0.5–1.3)
CREAT UR-MCNC: 149.1 MG/DL (ref 20–370)
EGFRCR SERPLBLD CKD-EPI 2021: 26 ML/MIN/1.73M*2
ERYTHROCYTE [DISTWIDTH] IN BLOOD BY AUTOMATED COUNT: 17 % (ref 11.5–14.5)
GLUCOSE SERPL-MCNC: 200 MG/DL (ref 74–99)
HCT VFR BLD AUTO: 34.9 % (ref 41–52)
HGB BLD-MCNC: 10.8 G/DL (ref 13.5–17.5)
MCH RBC QN AUTO: 23.8 PG (ref 26–34)
MCHC RBC AUTO-ENTMCNC: 30.9 G/DL (ref 32–36)
MCV RBC AUTO: 77 FL (ref 80–100)
MICROALBUMIN UR-MCNC: 26 MG/L
MICROALBUMIN/CREAT UR: 17.4 UG/MG CREAT
NRBC BLD-RTO: 0 /100 WBCS (ref 0–0)
PHOSPHATE SERPL-MCNC: 3.4 MG/DL (ref 2.5–4.9)
PLATELET # BLD AUTO: 184 X10*3/UL (ref 150–450)
POTASSIUM SERPL-SCNC: 3.7 MMOL/L (ref 3.5–5.3)
RBC # BLD AUTO: 4.53 X10*6/UL (ref 4.5–5.9)
SODIUM SERPL-SCNC: 141 MMOL/L (ref 136–145)
TSH SERPL-ACNC: 3.36 MIU/L (ref 0.44–3.98)
WBC # BLD AUTO: 8.3 X10*3/UL (ref 4.4–11.3)

## 2024-09-24 PROCEDURE — 82043 UR ALBUMIN QUANTITATIVE: CPT

## 2024-09-24 PROCEDURE — 82306 VITAMIN D 25 HYDROXY: CPT

## 2024-09-24 PROCEDURE — 83036 HEMOGLOBIN GLYCOSYLATED A1C: CPT

## 2024-09-24 PROCEDURE — 84443 ASSAY THYROID STIM HORMONE: CPT

## 2024-09-24 PROCEDURE — 36415 COLL VENOUS BLD VENIPUNCTURE: CPT

## 2024-09-24 PROCEDURE — 82570 ASSAY OF URINE CREATININE: CPT

## 2024-09-24 PROCEDURE — 80069 RENAL FUNCTION PANEL: CPT

## 2024-09-24 PROCEDURE — 83970 ASSAY OF PARATHORMONE: CPT

## 2024-09-24 PROCEDURE — 85027 COMPLETE CBC AUTOMATED: CPT

## 2024-09-25 LAB
EST. AVERAGE GLUCOSE BLD GHB EST-MCNC: 157 MG/DL
HBA1C MFR BLD: 7.1 %
PTH-INTACT SERPL-MCNC: 143.9 PG/ML (ref 18.5–88)

## 2024-10-03 DIAGNOSIS — I10 ESSENTIAL HYPERTENSION: ICD-10-CM

## 2024-10-03 DIAGNOSIS — K21.9 GASTROESOPHAGEAL REFLUX DISEASE, UNSPECIFIED WHETHER ESOPHAGITIS PRESENT: ICD-10-CM

## 2024-10-04 ENCOUNTER — APPOINTMENT (OUTPATIENT)
Dept: PRIMARY CARE | Facility: CLINIC | Age: 84
End: 2024-10-04
Payer: MEDICARE

## 2024-10-04 VITALS
TEMPERATURE: 97.1 F | HEART RATE: 72 BPM | HEIGHT: 68 IN | BODY MASS INDEX: 35.88 KG/M2 | RESPIRATION RATE: 16 BRPM | SYSTOLIC BLOOD PRESSURE: 126 MMHG | DIASTOLIC BLOOD PRESSURE: 58 MMHG | OXYGEN SATURATION: 96 %

## 2024-10-04 DIAGNOSIS — I73.9 PVD (PERIPHERAL VASCULAR DISEASE) (CMS-HCC): ICD-10-CM

## 2024-10-04 DIAGNOSIS — N18.32 ANEMIA IN STAGE 3B CHRONIC KIDNEY DISEASE (MULTI): ICD-10-CM

## 2024-10-04 DIAGNOSIS — E11.42 TYPE 2 DIABETES MELLITUS WITH DIABETIC POLYNEUROPATHY, WITHOUT LONG-TERM CURRENT USE OF INSULIN: ICD-10-CM

## 2024-10-04 DIAGNOSIS — I10 ESSENTIAL HYPERTENSION: ICD-10-CM

## 2024-10-04 DIAGNOSIS — D22.9 ATYPICAL MOLE: ICD-10-CM

## 2024-10-04 DIAGNOSIS — E78.2 MIXED HYPERLIPIDEMIA: ICD-10-CM
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
## 2024-10-04 DIAGNOSIS — D63.1 ANEMIA IN STAGE 3B CHRONIC KIDNEY DISEASE (MULTI): ICD-10-CM

## 2024-10-04 RX ORDER — TERAZOSIN 1 MG/1
1 CAPSULE ORAL EVERY 12 HOURS
Qty: 180 CAPSULE | Refills: 3 | Status: SHIPPED | OUTPATIENT
Start: 2024-10-04

## 2024-10-04 RX ORDER — PANTOPRAZOLE SODIUM 40 MG/1
40 TABLET, DELAYED RELEASE ORAL DAILY
Qty: 90 TABLET | Refills: 3 | Status: SHIPPED | OUTPATIENT
Start: 2024-10-04

## 2024-10-04 RX ORDER — VALSARTAN AND HYDROCHLOROTHIAZIDE 320; 25 MG/1; MG/1
1 TABLET, FILM COATED ORAL DAILY
Qty: 90 TABLET | Refills: 3 | Status: SHIPPED | OUTPATIENT
Start: 2024-10-04

## 2024-10-04 RX ORDER — AMLODIPINE BESYLATE 5 MG/1
TABLET ORAL
Qty: 90 TABLET | Refills: 3 | Status: SHIPPED | OUTPATIENT
Start: 2024-10-04

## 2024-10-04 RX ORDER — DICLOFENAC SODIUM 10 MG/G
4 GEL TOPICAL 4 TIMES DAILY PRN
Qty: 100 G | Refills: 3 | Status: SHIPPED | OUTPATIENT
Start: 2024-10-04

## 2024-10-04 NOTE — PROGRESS NOTES
Subjective   Patient ID: Juarez Chávez is a 84 y.o. male who presents for Lesion Removal (Re-cut left back lesion--possible early melanoma).  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    Chronic kidney disease, stage 4 (severe) (Multi)    Stricture of artery (CMS-HCC)    Cerebrovascular accident (CVA) with involvement of left side of body (Multi)    Class 2 obesity with body mass index (BMI) of 37.0 to 37.9 in adult    Class 2 obesity with body mass index (BMI) of 38.0 to 38.9 in adult    Class 2 severe obesity with serious comorbidity and body mass index (BMI) of 38.0 to 38.9 in adult    Dysuria    Former smoker    Palpitations    Seborrheic dermatitis    Severe obesity with body mass index (BMI) of 35.0 to 39.9 with serious comorbidity (Multi)    Elevated PSA    PVD (peripheral vascular disease) (CMS-HCC)    Paroxysmal supraventricular tachycardia (CMS-HCC)    Right-sided epistaxis [R04.0]    Nasal septal perforation     Review of Systems  This patient has   NO history of recent Covid nor flu symptoms,  NO Fever nor chills,  NO Chest pain, shortness of breath nor paroxysmal nocturnal dyspnea,  NO Nausea, vomiting, nor diarrhea,  NO Hematochezia nor melena,  NO Dysuria, hematuria, nor new incontinence issues  NO new severe headaches nor neurological complaints,  NO new issues  "with anxiety nor depression nor new psychiatric complaints,  NO suicidal nor homicidal ideations.     OBJECTIVE:  /58   Pulse 72   Temp 36.2 °C (97.1 °F) (Temporal)   Resp 16   Ht 1.727 m (5' 8\")   SpO2 96%   BMI 35.88 kg/m²      General:  alert, oriented, no acute distress.  No obvious skin rashes noted.   No gait disturbance noted.    Mood is pleasant, not tearful, no signs of emotional distress.  Normal, appropriate behavior.    HEENT: Normocephalic, atraumatic,   Pupils round, reactive to light    Neck: no nuchal rigidity  Respiratory: Equal breath sounds  No wheezes,    rales,    nor rhonchi  No respiratory distress.    Heart: Regular rate and rhythm, no    murmurs  no rubs/gallops    Abdomen: no masses palpable, no rebound nor guarding, no rebound nor guarding.    Extremities: NO cyanosis noted, no clubbing.   No edema noted.  Normal-not antalgic, steady gait.  L back mole site [id number] excision made 6-8mm as suggested by pathology    BIOPSY:  The patient was prepped and draped in normal sterile fashion and fully consented for a biopsy of sites identified:                2  cc lidocaine    with     epi total injected          shave biopsy performed     Excellent hemostasis obtained with      electrocautery  EBL  <   2 ml    0 sutures placed    Patient tolerated procedure well no complications or unanticipated events during procedure.    Patient was instructed on wound care, signs and symptoms of infection and instructed to call if occur.  If uncontrolled bleeding-call or ER as this is not anticipated.    Recheck would advised at next scheduled visit-sooner if issues arise.  Lab on 09/24/2024   Component Date Value Ref Range Status    Glucose 09/24/2024 200 (H)  74 - 99 mg/dL Final    Sodium 09/24/2024 141  136 - 145 mmol/L Final    Potassium 09/24/2024 3.7  3.5 - 5.3 mmol/L Final    Chloride 09/24/2024 107  98 - 107 mmol/L Final    Bicarbonate 09/24/2024 26  21 - 32 mmol/L Final    Anion Gap " 09/24/2024 12  10 - 20 mmol/L Final    Urea Nitrogen 09/24/2024 31 (H)  6 - 23 mg/dL Final    Creatinine 09/24/2024 2.41 (H)  0.50 - 1.30 mg/dL Final    eGFR 09/24/2024 26 (L)  >60 mL/min/1.73m*2 Final    Calculations of estimated GFR are performed using the 2021 CKD-EPI Study Refit equation without the race variable for the IDMS-Traceable creatinine methods.  https://jasn.asnjournals.org/content/early/2021/09/22/ASN.7989634159    Calcium 09/24/2024 8.3 (L)  8.6 - 10.3 mg/dL Final    Phosphorus 09/24/2024 3.4  2.5 - 4.9 mg/dL Final    The performance characteristics of phosphorus testing in heparinized plasma have been validated by the individual  laboratory site where testing is performed. Testing on heparinized plasma is not approved by the FDA; however, such approval is not necessary.    Albumin 09/24/2024 3.6  3.4 - 5.0 g/dL Final    WBC 09/24/2024 8.3  4.4 - 11.3 x10*3/uL Final    nRBC 09/24/2024 0.0  0.0 - 0.0 /100 WBCs Final    RBC 09/24/2024 4.53  4.50 - 5.90 x10*6/uL Final    Hemoglobin 09/24/2024 10.8 (L)  13.5 - 17.5 g/dL Final    Hematocrit 09/24/2024 34.9 (L)  41.0 - 52.0 % Final    MCV 09/24/2024 77 (L)  80 - 100 fL Final    MCH 09/24/2024 23.8 (L)  26.0 - 34.0 pg Final    MCHC 09/24/2024 30.9 (L)  32.0 - 36.0 g/dL Final    RDW 09/24/2024 17.0 (H)  11.5 - 14.5 % Final    Platelets 09/24/2024 184  150 - 450 x10*3/uL Final    Thyroid Stimulating Hormone 09/24/2024 3.36  0.44 - 3.98 mIU/L Final    Hemoglobin A1C 09/24/2024 7.1 (H)  See comment % Final    Estimated Average Glucose 09/24/2024 157  Not Established mg/dL Final    Parathyroid Hormone, Intact 09/24/2024 143.9 (H)  18.5 - 88.0 pg/mL Final    Vitamin D, 25-Hydroxy, Total 09/24/2024 56  30 - 100 ng/mL Final    Albumin, Urine Random 09/24/2024 26.0  Not established mg/L Final    Creatinine, Urine Random 09/24/2024 149.1  20.0 - 370.0 mg/dL Final    Albumin/Creatinine Ratio 09/24/2024 17.4  <30.0 ug/mg Creat Final   Lab on 08/21/2024   Component  Date Value Ref Range Status    WBC 08/21/2024 8.8  4.4 - 11.3 x10*3/uL Final    nRBC 08/21/2024 0.0  0.0 - 0.0 /100 WBCs Final    RBC 08/21/2024 4.57  4.50 - 5.90 x10*6/uL Final    Hemoglobin 08/21/2024 11.0 (L)  13.5 - 17.5 g/dL Final    Hematocrit 08/21/2024 34.7 (L)  41.0 - 52.0 % Final    MCV 08/21/2024 76 (L)  80 - 100 fL Final    MCH 08/21/2024 24.1 (L)  26.0 - 34.0 pg Final    MCHC 08/21/2024 31.7 (L)  32.0 - 36.0 g/dL Final    RDW 08/21/2024 16.5 (H)  11.5 - 14.5 % Final    Platelets 08/21/2024 209  150 - 450 x10*3/uL Final    Neutrophils % 08/21/2024 62.9  40.0 - 80.0 % Final    Immature Granulocytes %, Automated 08/21/2024 0.3  0.0 - 0.9 % Final    Immature Granulocyte Count (IG) includes promyelocytes, myelocytes and metamyelocytes but does not include bands. Percent differential counts (%) should be interpreted in the context of the absolute cell counts (cells/UL).    Lymphocytes % 08/21/2024 21.3  13.0 - 44.0 % Final    Monocytes % 08/21/2024 8.4  2.0 - 10.0 % Final    Eosinophils % 08/21/2024 6.2  0.0 - 6.0 % Final    Basophils % 08/21/2024 0.9  0.0 - 2.0 % Final    Neutrophils Absolute 08/21/2024 5.55 (H)  1.60 - 5.50 x10*3/uL Final    Percent differential counts (%) should be interpreted in the context of the absolute cell counts (cells/uL).    Immature Granulocytes Absolute, Au* 08/21/2024 0.03  0.00 - 0.50 x10*3/uL Final    Lymphocytes Absolute 08/21/2024 1.88  0.80 - 3.00 x10*3/uL Final    Monocytes Absolute 08/21/2024 0.74  0.05 - 0.80 x10*3/uL Final    Eosinophils Absolute 08/21/2024 0.55 (H)  0.00 - 0.40 x10*3/uL Final    Basophils Absolute 08/21/2024 0.08  0.00 - 0.10 x10*3/uL Final    Glucose 08/21/2024 166 (H)  74 - 99 mg/dL Final    Sodium 08/21/2024 143  136 - 145 mmol/L Final    Potassium 08/21/2024 3.6  3.5 - 5.3 mmol/L Final    Chloride 08/21/2024 107  98 - 107 mmol/L Final    Bicarbonate 08/21/2024 26  21 - 32 mmol/L Final    Anion Gap 08/21/2024 14  10 - 20 mmol/L Final    Urea  Nitrogen 08/21/2024 33 (H)  6 - 23 mg/dL Final    Creatinine 08/21/2024 2.32 (H)  0.50 - 1.30 mg/dL Final    eGFR 08/21/2024 27 (L)  >60 mL/min/1.73m*2 Final    Calculations of estimated GFR are performed using the 2021 CKD-EPI Study Refit equation without the race variable for the IDMS-Traceable creatinine methods.  https://jasn.asnjournals.org/content/early/2021/09/22/ASN.5590387182    Calcium 08/21/2024 8.3 (L)  8.6 - 10.3 mg/dL Final    Albumin 08/21/2024 3.5  3.4 - 5.0 g/dL Final    Alkaline Phosphatase 08/21/2024 70  33 - 136 U/L Final    Total Protein 08/21/2024 6.5  6.4 - 8.2 g/dL Final    AST 08/21/2024 10  9 - 39 U/L Final    Bilirubin, Total 08/21/2024 0.5  0.0 - 1.2 mg/dL Final    ALT 08/21/2024 7 (L)  10 - 52 U/L Final    Patients treated with Sulfasalazine may generate falsely decreased results for ALT.    Hemoglobin A1C 08/21/2024 7.3 (H)  see below % Final    Estimated Average Glucose 08/21/2024 163  Not Established mg/dL Final    Ferritin 08/21/2024 31  20 - 300 ng/mL Final    Vitamin B12 08/21/2024 289  211 - 911 pg/mL Final   Office Visit on 08/21/2024   Component Date Value Ref Range Status    Case Report 08/21/2024    Final                    Value:Dermatopathology                                  Case: [id number]                                   Authorizing Provider:  Jaimee Monte MD   Collected:           08/21/2024 1714              Ordering Location:     [facility]  Received:            08/21/2024 1714                                     Building                                                                     Pathologist:           Eze Burrows MD                                                             Specimens:   A) - SKIN, LEFT BACK                                                                                B) - SKIN, RIGHT SHOULDER                                                                  FINAL DIAGNOSIS 08/21/2024    Final                     Value:A. SKIN, LEFT BACK, SHAVE BIOPSY:  ATYPICAL MELANOCYTIC HYPERPLASIA, SEE NOTE.    Note: Microscopic examination reveals a specimen that extends into the superficial dermis. A moderate amount of the epidermis is lifted off of both the dermis and some parts of the remaining epidermis. There is heavy epidermal melanin pigmentation and there is a superficial lymphocytic infiltrate with melanophages and crush artifact. Multiple step sections were performed. The melanocytes do not stain with antibodies against PRAME. All control slides stain appropriately.     An early melanoma in situ cannot be excluded and a complete full thickness re-excision with 5 mm margins is recommended.     B. SKIN, RIGHT SHOULDER, SHAVE BIOPSY:  MILDLY DYSPLASTIC COMPOUND NEVUS, INKED MARGINS FREE IN THE PLANES OF SECTIONS EXAMINED.    ** Electronically signed out by Eze Burrows MD **          08/21/2024    Final                    Value:By the signature on this report, the individual or group listed as making the Final Interpretation/Diagnosis certifies that they have reviewed this case.       Clinical History 08/21/2024    Final                    Value:Encounter Diagnosis: Atypical mole     Clinical History Special Request: n/a      Microscopic Description 08/21/2024    Final                    Value:B. Microscopic analysis shows an asymmetric proliferation of melanocytes, with lentiginous hyperplasia of epidermis.  In addition to melanocytes that  nest irregularly along the dermal-epidermal junction, other features of dysplasia include a shoulder of junctional nests of melanocytes that extend beyond the dermal nests, melanocyte bridging, papillary dermal fibroplasia, melanophages, and inflammatory cells in papillary dermis.  The melanocytes in epidermis show random cytologic atypia.          Disclaimer 08/21/2024    Final                    Value:One or more of the reagents used to perform assays on this specimen MAY have contained  components considered to be analyte specific reagents (ASR's).  ASR's have not been cleared or approved by the U.S. Food and Drug Administration.  These assays were developed and their performance characteristics determined by the Department of Pathology at [facility]. The FDA does not require this test to go through premarket FDA review. This test is used for clinical purposes. It should not be regarded as investigational or for research. This laboratory is certified under the Clinical Laboratory Improvement Amendments (CLIA) as qualified to perform high complexity clinical laboratory testing.  The assays were performed with appropriate positive and negative controls which stained appropriately.      Gross Description 08/21/2024    Final                    Value:A:  Received in formalin is a 7 x 5 x 1 mm piece of skin in aggre that was falling apart.  It is tan in color.  It is shave in shape.  It was embedded in toto.  The specimen was inked.        B:  Received in formalin is a 8 x 6 x 1 mm piece of skin.  It is tan and brown in color.  It is shave in shape.  It was embedded in toto.  The specimen was inked.      The specimen was grossed by Ute Hinojosa.              Assessment/Plan     Problem List Items Addressed This Visit       Anemia in stage 3b chronic kidney disease (Multi)    Type 2 diabetes mellitus with diabetic polyneuropathy    Essential hypertension    Mixed hyperlipidemia    PVD (peripheral vascular disease) (CMS-HCC)     Other Visit Diagnoses       Fall, initial encounter        Relevant Medications    diclofenac sodium (Voltaren) 1 % gel    Atypical mole                Patient was informed of wound care and signs and symptoms of infection necessitating a phone call/ office visit or emergency room visit. Expected course of healing and common reactions also discussed. If specimen sent for pathology results should be able to be seen on my chart in 1-2 weeks  and or letter sent. If nothing received by 3 weeks time-patient is aware they need to contact office for results as this is not usual.

## 2024-10-08 LAB
LABORATORY COMMENT REPORT: NORMAL
PATH REPORT.FINAL DX SPEC: NORMAL
PATH REPORT.GROSS SPEC: NORMAL
PATH REPORT.MICROSCOPIC SPEC OTHER STN: NORMAL
PATH REPORT.RELEVANT HX SPEC: NORMAL
PATH REPORT.TOTAL CANCER: NORMAL

## 2024-12-18 ENCOUNTER — OFFICE VISIT (OUTPATIENT)
Dept: CARDIOLOGY | Facility: CLINIC | Age: 84
End: 2024-12-18
Payer: MEDICARE

## 2024-12-18 ENCOUNTER — APPOINTMENT (OUTPATIENT)
Dept: CARDIOLOGY | Facility: CLINIC | Age: 84
End: 2024-12-18
Payer: MEDICARE

## 2024-12-18 VITALS
WEIGHT: 236 LBS | SYSTOLIC BLOOD PRESSURE: 138 MMHG | HEART RATE: 64 BPM | DIASTOLIC BLOOD PRESSURE: 60 MMHG | BODY MASS INDEX: 35.77 KG/M2 | HEIGHT: 68 IN

## 2024-12-18 DIAGNOSIS — E11.42 TYPE 2 DIABETES MELLITUS WITH DIABETIC POLYNEUROPATHY, WITHOUT LONG-TERM CURRENT USE OF INSULIN: ICD-10-CM

## 2024-12-18 DIAGNOSIS — E66.01 SEVERE OBESITY WITH BODY MASS INDEX (BMI) OF 35.0 TO 39.9 WITH SERIOUS COMORBIDITY (MULTI): ICD-10-CM

## 2024-12-18 DIAGNOSIS — I63.9 CEREBROVASCULAR ACCIDENT (CVA) WITH INVOLVEMENT OF LEFT SIDE OF BODY (MULTI): ICD-10-CM

## 2024-12-18 DIAGNOSIS — Z87.891 FORMER SMOKER: ICD-10-CM

## 2024-12-18 DIAGNOSIS — I47.10 PAROXYSMAL SUPRAVENTRICULAR TACHYCARDIA (CMS-HCC): ICD-10-CM

## 2024-12-18 DIAGNOSIS — I10 ESSENTIAL HYPERTENSION: ICD-10-CM

## 2024-12-18 DIAGNOSIS — N18.9 CHRONIC RENAL IMPAIRMENT, UNSPECIFIED CKD STAGE: ICD-10-CM

## 2024-12-18 DIAGNOSIS — E78.2 MIXED HYPERLIPIDEMIA: ICD-10-CM

## 2024-12-18 PROCEDURE — 1157F ADVNC CARE PLAN IN RCRD: CPT | Performed by: INTERNAL MEDICINE

## 2024-12-18 PROCEDURE — 3078F DIAST BP <80 MM HG: CPT | Performed by: INTERNAL MEDICINE

## 2024-12-18 PROCEDURE — 99214 OFFICE O/P EST MOD 30 MIN: CPT | Performed by: INTERNAL MEDICINE

## 2024-12-18 PROCEDURE — 1159F MED LIST DOCD IN RCRD: CPT | Performed by: INTERNAL MEDICINE

## 2024-12-18 PROCEDURE — 3075F SYST BP GE 130 - 139MM HG: CPT | Performed by: INTERNAL MEDICINE

## 2024-12-18 PROCEDURE — 1123F ACP DISCUSS/DSCN MKR DOCD: CPT | Performed by: INTERNAL MEDICINE

## 2024-12-18 NOTE — PATIENT INSTRUCTIONS
Continue same medications and treatments.   Patient educated on proper medication use.   Patient educated on risk factor modification.   Please bring any lab results from other providers / physicians to your next appointment.     Please bring all medicines, vitamins, and herbal supplements with you when you come to the office.     Prescriptions will not be filled unless you are compliant with your follow up appointments or have a follow up appointment scheduled as per instruction of your physician. Refills should be requested at the time of your visit.    FOLLOW UP IN 1 YEAR    I, Adry Tafoya LPN, am scribing for and in the presence of Dr. Lester Perez, DO, FACC

## 2024-12-18 NOTE — PROGRESS NOTES
CARDIOLOGY OFFICE VISIT      CHIEF COMPLAINT  Chief Complaint   Patient presents with    Follow-up     1 year         HISTORY OF PRESENT ILLNESS  Patient has 84-year-old  male with past medical history significant for cerebrovascular accident with left-sided hemiparesis 2019, recurrent cerebrovascular accident with dysarthria , hypertension, hyperlipidemia, diabetes mellitus, chronic renal insufficiency, anemia, gout, gastroesophageal reflux disease, obesity who presents for cardiac evaluation.     Patient has occasional right shoulder pain that radiates to his right side of his chest when sleeping on his right side.  Denies exertional midsternal chest pain, dyspnea exertion, palpitations, nausea, vomiting, dizziness, near syncope.  Patient has chronic lower extremity edema which is unchanged.  He had 1 fall and his vascular physician took him off of Eliquis.     Past surgical history:  Cholectomy neck  Colostomy  Gastric tube insertion and removal     Social history:  Quit smoking   Quit drinking alcohol      Family history:  Mother  with hypertension  Father  cerebrovascular accident     Review of systems have been reviewed and are negative, noncontributory, or as previously mentioned x12 systems.     I personally reviewed EKG 2022: Sinus bradycardia 59 bpm.     Assessment:  Paroxysmal supraventricular tachycardia, rates 120 bpm  Cerebrovascular accident with left-sided hemiparesis 2019  Cerebrovascular accident with dysarthria 2022  Peripheral arterial disease?  (Possible bilateral lower extremity PCI-details unknown)- Dr.Barry Langford  Venous insufficiency-bilateral lower limb extremity venous procedures  Hypertension  Hyperlipidemia  Diabetes mellitus  Chronic renal insufficiency-Dr. Solis  BPH  Anemia  Gastroesophageal reflux disease  Gout  Obesity     Recommendations:  Patient appears to be stable from a cardiac standpoint.  No  symptoms of angina.  No symptomatic arrhythmia.  No evidence of heart failure.  Blood pressure under control.  I have asked the patient and wife to bring in copies of any vascular surgeries that have been done.  Apparently they were done in the office so it is not clear that the patient had arterial PCI.  Follow-up with myself in 1 year.     Please excuse any errors in grammar or translation related to this dictation. Voice recognition software was utilized to prepare this document.     Recent Cardiovascular Testing:  The following results have been reviewed and updated.   Labs 3/20/24  , , HDL 30, LDL 46     Echo 8/9/22  1.EF 60b%  2.Bubble Study Negative.  3.Mild MR  4.Left atrium is mild to moderately dilated.        PVR 11/2/22  1. Mild     MARY 11/3/22  1. EF 60-65%  2. Left atrium is severely dilated  3. Mild to moderate mitral valve regurgitation  4. No left atrial thrombus  5. No evidence of a PFO     30 day event monitor 11/4/22  1. One episode of PSVT, 22 beat duration, rate 128 bpm  2. No evidence of atrial fibrillation    Lower extremity right venous duplex 6/11/24  No DVT on right      VITALS  Vitals:    12/18/24 1409   BP: 138/60   Pulse: 64     Wt Readings from Last 4 Encounters:   12/18/24 107 kg (236 lb)   08/21/24 107 kg (236 lb)   06/11/24 104 kg (230 lb)   12/20/23 104 kg (229 lb)       PHYSICAL EXAM:  GENERAL:  Well developed, well nourished, in no acute distress.  CHEST:  Symmetric and nontender.  NEURO/PSYCH:  Alert and oriented times three with approppriate behavior and responses.  NECK:  Supple, no JVD, no bruit.  LUNGS:  Clear to auscultation bilaterally, normal respiratory effort.  HEART:  Rate and rhythm REGULAR with no evident murmur, no gallop appreciated.    There are no rubs, clicks or heaves.  EXTREMITIES:  Warm with good color, no clubbing or cyanosis.  There is no edema noted.  PERIPHERAL VASCULAR:  Pulses present and equally palpable; 2+ throughout.    ASSESSMENT AND  PLAN  Diagnoses and all orders for this visit:  Paroxysmal supraventricular tachycardia (CMS-HCC)  Cerebrovascular accident (CVA) with involvement of left side of body (Multi)  Essential hypertension  Mixed hyperlipidemia  Type 2 diabetes mellitus with diabetic polyneuropathy, without long-term current use of insulin  Chronic renal impairment, unspecified CKD stage  Severe obesity with body mass index (BMI) of 35.0 to 39.9 with serious comorbidity (Multi)  Former smoker      Past Medical History  Past Medical History:   Diagnosis Date    Colon cancer screening declined     History of CVA (cerebrovascular accident) 2019       Social History  Social History     Tobacco Use    Smoking status: Former     Current packs/day: 0.00     Types: Cigarettes     Quit date:      Years since quittin.9    Smokeless tobacco: Not on file   Substance Use Topics    Alcohol use: Not Currently    Drug use: Never       Family History     Family History   Problem Relation Name Age of Onset    Hypertension Mother      Stroke Father          Allergies:  Allergies   Allergen Reactions    Iodine Other    Shellfish Containing Products Unknown        Outpatient Medications:  Current Outpatient Medications   Medication Instructions    albuterol 90 mcg/actuation inhaler 2 puffs, inhalation, Every 6 hours PRN    alpha tocopherol (Vitamin E) 670 mg (1,000 unit) capsule Take by mouth.    amLODIPine (Norvasc) 5 mg tablet TAKE 1 TABLET BY MOUTH ONCE  EVERY 24 HOURS    ascorbic acid, vitamin C, 1,000 mg ER tablet 1 tablet, Daily    atorvastatin (LIPITOR) 40 mg, oral, Daily    cholecalciferol, vitamin D3, 50 mcg (2,000 unit) tablet,chewable Chew.    diclofenac sodium (VOLTAREN) 4 g, Topical, 4 times daily PRN    finasteride (PROSCAR) 5 mg, oral, Daily    fluocinolone (Synalar) 0.01 % external solution 2 times daily    fluocinolone 0.01 % shampoo 1 Applicatorful, topical (top), Every other day    garlic (GARLIQUE ORAL) as directed    Januvia  50 mg, oral, Daily    pantoprazole (PROTONIX) 40 mg, oral, Daily    potassium gluconate 595 mg (99 mg) tablet Take by mouth.    terazosin (HYTRIN) 1 mg, oral, Every 12 hours    valsartan-hydrochlorothiazide (Diovan-HCT) 320-25 mg tablet 1 tablet, oral, Daily        Recent Lab Results:    CMP:    Lab Results   Component Value Date     09/24/2024    K 3.7 09/24/2024     09/24/2024    CO2 26 09/24/2024    BUN 31 (H) 09/24/2024    CREATININE 2.41 (H) 09/24/2024    GLUCOSE 200 (H) 09/24/2024    CALCIUM 8.3 (L) 09/24/2024       Magnesium:    Lab Results   Component Value Date    MG 1.80 08/09/2022       Lipid Profile:    Lab Results   Component Value Date    TRIG 132 03/20/2024    HDL 30.1 03/20/2024    LDLCALC 46 03/20/2024       TSH:    Lab Results   Component Value Date    TSH 3.36 09/24/2024       BNP:   Lab Results   Component Value Date    BNP 60 08/09/2022        PT/INR:    Lab Results   Component Value Date    PROTIME 13.4 (H) 06/11/2024    INR 1.2 (H) 06/11/2024       HgBA1c:    Lab Results   Component Value Date    HGBA1C 7.1 (H) 09/24/2024       BMP:  Lab Results   Component Value Date     09/24/2024     08/21/2024     06/11/2024    K 3.7 09/24/2024    K 3.6 08/21/2024    K 3.4 (L) 06/11/2024     09/24/2024     08/21/2024     06/11/2024    CO2 26 09/24/2024    CO2 26 08/21/2024    CO2 27 06/11/2024    BUN 31 (H) 09/24/2024    BUN 33 (H) 08/21/2024    BUN 28 (H) 06/11/2024    CREATININE 2.41 (H) 09/24/2024    CREATININE 2.32 (H) 08/21/2024    CREATININE 2.51 (H) 06/11/2024       CBC:  Lab Results   Component Value Date    WBC 8.3 09/24/2024    WBC 8.8 08/21/2024    WBC 10.4 06/11/2024    RBC 4.53 09/24/2024    RBC 4.57 08/21/2024    RBC 4.28 (L) 06/11/2024    HGB 10.8 (L) 09/24/2024    HGB 11.0 (L) 08/21/2024    HGB 10.3 (L) 06/11/2024    HCT 34.9 (L) 09/24/2024    HCT 34.7 (L) 08/21/2024    HCT 32.6 (L) 06/11/2024    MCV 77 (L) 09/24/2024    MCV 76 (L) 08/21/2024     MCV 76 (L) 06/11/2024    MCH 23.8 (L) 09/24/2024    MCH 24.1 (L) 08/21/2024    MCH 24.1 (L) 06/11/2024    MCHC 30.9 (L) 09/24/2024    MCHC 31.7 (L) 08/21/2024    MCHC 31.6 (L) 06/11/2024    RDW 17.0 (H) 09/24/2024    RDW 16.5 (H) 08/21/2024    RDW 15.9 (H) 06/11/2024     09/24/2024     08/21/2024     06/11/2024    MPV 10.8 10/31/2023       Cardiac Enzymes:    Lab Results   Component Value Date    TROPHS 7 08/08/2022       Hepatic Function Panel:    Lab Results   Component Value Date    ALKPHOS 70 08/21/2024    ALT 7 (L) 08/21/2024    AST 10 08/21/2024    PROT 6.5 08/21/2024    BILITOT 0.5 08/21/2024           Lester Perez DO

## 2025-01-08 DIAGNOSIS — N40.0 BENIGN PROSTATIC HYPERPLASIA, UNSPECIFIED WHETHER LOWER URINARY TRACT SYMPTOMS PRESENT: ICD-10-CM

## 2025-01-08 DIAGNOSIS — E78.2 MIXED HYPERLIPIDEMIA: ICD-10-CM

## 2025-01-09 RX ORDER — FINASTERIDE 5 MG/1
5 TABLET, FILM COATED ORAL DAILY
Qty: 90 TABLET | Refills: 3 | Status: SHIPPED | OUTPATIENT
Start: 2025-01-09

## 2025-01-09 RX ORDER — ATORVASTATIN CALCIUM 40 MG/1
40 TABLET, FILM COATED ORAL DAILY
Qty: 90 TABLET | Refills: 3 | Status: SHIPPED | OUTPATIENT
Start: 2025-01-09

## 2025-01-13 ENCOUNTER — OFFICE VISIT (OUTPATIENT)
Dept: URGENT CARE | Age: 85
End: 2025-01-13
Payer: MEDICARE

## 2025-01-13 VITALS
RESPIRATION RATE: 18 BRPM | TEMPERATURE: 97.8 F | WEIGHT: 232 LBS | OXYGEN SATURATION: 95 % | BODY MASS INDEX: 36.41 KG/M2 | DIASTOLIC BLOOD PRESSURE: 83 MMHG | HEIGHT: 67 IN | HEART RATE: 80 BPM | SYSTOLIC BLOOD PRESSURE: 144 MMHG

## 2025-01-13 DIAGNOSIS — N39.0 URINARY TRACT INFECTION WITH HEMATURIA, SITE UNSPECIFIED: ICD-10-CM

## 2025-01-13 DIAGNOSIS — R31.9 URINARY TRACT INFECTION WITH HEMATURIA, SITE UNSPECIFIED: ICD-10-CM

## 2025-01-13 LAB
POC APPEARANCE, URINE: ABNORMAL
POC BILIRUBIN, URINE: NEGATIVE
POC BLOOD, URINE: ABNORMAL
POC COLOR, URINE: ABNORMAL
POC GLUCOSE, URINE: NEGATIVE MG/DL
POC KETONES, URINE: NEGATIVE MG/DL
POC LEUKOCYTES, URINE: ABNORMAL
POC NITRITE,URINE: NEGATIVE
POC PH, URINE: 6 PH
POC PROTEIN, URINE: ABNORMAL MG/DL
POC SPECIFIC GRAVITY, URINE: 1.02
POC UROBILINOGEN, URINE: 0.2 EU/DL

## 2025-01-13 PROCEDURE — 1157F ADVNC CARE PLAN IN RCRD: CPT | Performed by: FAMILY MEDICINE

## 2025-01-13 PROCEDURE — 87086 URINE CULTURE/COLONY COUNT: CPT

## 2025-01-13 PROCEDURE — 81003 URINALYSIS AUTO W/O SCOPE: CPT | Performed by: FAMILY MEDICINE

## 2025-01-13 PROCEDURE — 99203 OFFICE O/P NEW LOW 30 MIN: CPT | Performed by: FAMILY MEDICINE

## 2025-01-13 PROCEDURE — 3077F SYST BP >= 140 MM HG: CPT | Performed by: FAMILY MEDICINE

## 2025-01-13 PROCEDURE — 3079F DIAST BP 80-89 MM HG: CPT | Performed by: FAMILY MEDICINE

## 2025-01-13 PROCEDURE — 1123F ACP DISCUSS/DSCN MKR DOCD: CPT | Performed by: FAMILY MEDICINE

## 2025-01-13 PROCEDURE — 1159F MED LIST DOCD IN RCRD: CPT | Performed by: FAMILY MEDICINE

## 2025-01-13 PROCEDURE — 87186 SC STD MICRODIL/AGAR DIL: CPT

## 2025-01-13 RX ORDER — SULFAMETHOXAZOLE AND TRIMETHOPRIM 800; 160 MG/1; MG/1
1 TABLET ORAL 2 TIMES DAILY
Qty: 10 TABLET | Refills: 0 | Status: SHIPPED | OUTPATIENT
Start: 2025-01-13 | End: 2025-01-18

## 2025-01-13 ASSESSMENT — PATIENT HEALTH QUESTIONNAIRE - PHQ9
1. LITTLE INTEREST OR PLEASURE IN DOING THINGS: NOT AT ALL
2. FEELING DOWN, DEPRESSED OR HOPELESS: NOT AT ALL
SUM OF ALL RESPONSES TO PHQ9 QUESTIONS 1 AND 2: 0

## 2025-01-13 NOTE — PROGRESS NOTES
Subjective   Patient ID: Juarez Chávez is a 85 y.o. male. They present today with a chief complaint of Difficulty Urinating (Painful urination x2days).    Patient disposition: Home    History of Present Illness  HPI  1.5 days of difficulty and painful urination.  History of UTIs in the past, most recently several years ago.  No fever or chills.  No back pain.  No GI symptoms.  No abdominal pain.  No medications taken for the symptoms.  Took a home Azo test and was positive.  No recent illnesses.  Otherwise feels well.  No other complaints.      Past Medical History  Allergies as of 01/13/2025 - Reviewed 01/13/2025   Allergen Reaction Noted    Iodine Other 06/11/2024    Shellfish containing products Unknown 05/11/2023       (Not in a hospital admission)       Current Outpatient Medications   Medication Sig Dispense Refill    albuterol 90 mcg/actuation inhaler Inhale 2 puffs every 6 hours if needed for wheezing. 18 g 3    alpha tocopherol (Vitamin E) 670 mg (1,000 unit) capsule Take by mouth.      amLODIPine (Norvasc) 5 mg tablet TAKE 1 TABLET BY MOUTH ONCE  EVERY 24 HOURS 90 tablet 3    ascorbic acid, vitamin C, 1,000 mg ER tablet Take 1 tablet (1,000 mg) by mouth once daily.      atorvastatin (Lipitor) 40 mg tablet TAKE 1 TABLET BY MOUTH ONCE  DAILY 90 tablet 3    cholecalciferol, vitamin D3, 50 mcg (2,000 unit) tablet,chewable Chew.      diclofenac sodium (Voltaren) 1 % gel Apply 4.5 inches (4 g) topically 4 times a day as needed (knee pain). 100 g 3    finasteride (Proscar) 5 mg tablet TAKE 1 TABLET BY MOUTH ONCE  DAILY 90 tablet 3    fluocinolone (Synalar) 0.01 % external solution Apply topically 2 times a day.      fluocinolone 0.01 % shampoo Apply 1 Applicatorful topically every other day. 60 mL 3    Januvia 50 mg tablet TAKE 1 TABLET BY MOUTH DAILY 90 tablet 3    pantoprazole (ProtoNix) 40 mg EC tablet TAKE 1 TABLET BY MOUTH DAILY 90 tablet 3    potassium gluconate 595 mg (99 mg) tablet Take by mouth.       terazosin (Hytrin) 1 mg capsule TAKE 1 CAPSULE BY MOUTH EVERY 12 HOURS 180 capsule 3    valsartan-hydrochlorothiazide (Diovan-HCT) 320-25 mg tablet TAKE 1 TABLET BY MOUTH DAILY 90 tablet 3     No current facility-administered medications for this visit.       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    Chronic kidney disease, stage 4 (severe) (Multi)    Stricture of artery (CMS-HCC)    Cerebrovascular accident (CVA) with involvement of left side of body (Multi)    Class 2 obesity with body mass index (BMI) of 37.0 to 37.9 in adult    Class 2 obesity with body mass index (BMI) of 38.0 to 38.9 in adult    Class 2 severe obesity with serious comorbidity and body mass index (BMI) of 38.0 to 38.9 in adult    Dysuria    Former smoker    Palpitations    Seborrheic dermatitis    Severe obesity with body mass index (BMI) of 35.0 to 39.9 with serious comorbidity (Multi)    Elevated PSA    PVD (peripheral vascular disease) (CMS-HCC)    Paroxysmal supraventricular tachycardia (CMS-HCC)    Right-sided epistaxis [R04.0]    Nasal septal perforation       Past Surgical History:   Procedure Laterality Date    COLECTOMY  2006    COLONOSCOPY  2006    after colectomy    GASTROSTOMY TUBE PLACEMENT      2/2019-8/2019    MR HEAD ANGIO WO IV CONTRAST  08/09/2022    MR HEAD ANGIO WO IV CONTRAST 8/9/2022 [facility]    MR NECK ANGIO WO IV CONTRAST  08/09/2022    MR NECK ANGIO WO IV CONTRAST 8/9/2022 [facility]    VASCULAR SURGERY   "2023    Stent placement B/L Lower Extremetry - Feb 2023, april 2023,9-2023        reports that he quit smoking about 35 years ago. His smoking use included cigarettes. He does not have any smokeless tobacco history on file. He reports that he does not currently use alcohol. He reports that he does not use drugs.    Review of Systems  As noted in HPI. ROS otherwise negative unless noted.       Objective    Vitals:    01/13/25 1632   BP: 144/83   Pulse: 80   Resp: 18   Temp: 36.6 °C (97.8 °F)   TempSrc: Temporal   SpO2: 95%   Weight: 105 kg (232 lb)   Height: 1.702 m (5' 7\")     No LMP for male patient.    Physical Exam  Constitutional: vital signs reviewed. Well developed, well nourished. patient alert and patient without distress.   Head and Face: Normal and atraumatic.      Cardiovascular: Heart rate normal, normal S1 and S2, no gallops, no murmurs and no pericardial rub. Rhythm: Normal.  Pulmonary: No respiratory distress. Palpation of chest: Normal. Clear bilateral breath sounds.   Abdomen: Soft nontender; no abdominal mass palpated. No organomegaly.  Negative flank tenderness on percussion  Skin: Normal skin color and pigmentation, normal skin turgor, and no rash.  No upper extremity edema.  Patient in wheelchair.  Accompanied by family member.        Procedures    Point of Care Test & Imaging Results from this visit           Diagnostic study results (if any) were reviewed.    Assessment/Plan   Allergies, medications, history, and pertinent labs/EKGs/Imaging reviewed.    Medical Decision Making  See note    Orders and Diagnoses  Diagnoses and all orders for this visit:  Dysuria  -     POCT UA Automated manually resulted      Medical Admin Record      Follow Up Instructions  No follow-ups on file.    [At time of discharge patient was clinically well-appearing and HDS for outpatient management. The patient and/or family was educated regarding diagnosis, supportive care, OTC and Rx medications. The patient and/or " family was given the opportunity to ask questions prior to discharge and all questions answered. They verbalized understanding of my discussion of the plans for treatment, expected course, indications to return to  or seek further evaluation in ED, and the need for timely follow up as directed. ]      Electronically signed by [facility]

## 2025-01-13 NOTE — PATIENT INSTRUCTIONS
You have an infection of your urinary tract.  You will be started on antibiotic which will be sent to the pharmacy; please complete the regimen as directed even if your symptoms improve. It is recommended to take an Probiotic while on this medication to reduce symptoms of upset stomach, diarrhea.   If you started experiencing fevers, back pain, chills, return to the office or follow-up with your PCP sooner.  Increase your hydration with water, or cranberry juice, drinking 6-8 glasses of water per day.  Do not hold your urine, when you get the urge to urinate, void.  Additionally, you can take over-the-counter medication to help with the burning sensation: Pyridium or phenazopyridine (Azo). This may change your urine color orange.     A urine culture will be sent, if there is a need to change your antibiotic after the results, you will be notified

## 2025-01-16 LAB — BACTERIA UR CULT: ABNORMAL

## 2025-06-05 DIAGNOSIS — E11.69 TYPE 2 DIABETES MELLITUS WITH OTHER SPECIFIED COMPLICATION, WITHOUT LONG-TERM CURRENT USE OF INSULIN: ICD-10-CM

## 2025-06-06 RX ORDER — SITAGLIPTIN 50 MG/1
50 TABLET, FILM COATED ORAL DAILY
Qty: 90 TABLET | Refills: 3 | Status: SHIPPED | OUTPATIENT
Start: 2025-06-06

## 2025-08-07 DIAGNOSIS — I10 ESSENTIAL HYPERTENSION: ICD-10-CM

## 2025-08-08 RX ORDER — AMLODIPINE BESYLATE 5 MG/1
TABLET ORAL
Qty: 90 TABLET | Refills: 3 | Status: SHIPPED | OUTPATIENT
Start: 2025-08-08

## 2025-08-08 RX ORDER — TERAZOSIN 1 MG/1
1 CAPSULE ORAL EVERY 12 HOURS
Qty: 180 CAPSULE | Refills: 3 | Status: SHIPPED | OUTPATIENT
Start: 2025-08-08

## 2025-12-17 ENCOUNTER — APPOINTMENT (OUTPATIENT)
Dept: CARDIOLOGY | Facility: CLINIC | Age: 85
End: 2025-12-17
Payer: MEDICARE